# Patient Record
Sex: FEMALE | Race: WHITE | NOT HISPANIC OR LATINO | Employment: PART TIME | URBAN - METROPOLITAN AREA
[De-identification: names, ages, dates, MRNs, and addresses within clinical notes are randomized per-mention and may not be internally consistent; named-entity substitution may affect disease eponyms.]

---

## 2017-12-18 ENCOUNTER — OFFICE VISIT - RIVER FALLS (OUTPATIENT)
Dept: FAMILY MEDICINE | Facility: CLINIC | Age: 42
End: 2017-12-18

## 2017-12-18 ENCOUNTER — COMMUNICATION - RIVER FALLS (OUTPATIENT)
Dept: FAMILY MEDICINE | Facility: CLINIC | Age: 42
End: 2017-12-18

## 2017-12-18 ASSESSMENT — MIFFLIN-ST. JEOR: SCORE: 1649.2

## 2017-12-19 LAB
CHOLEST SERPL-MCNC: 246 MG/DL
CHOLEST/HDLC SERPL: 5.2 {RATIO}
HBA1C MFR BLD: 5.1 %
HDLC SERPL-MCNC: 47 MG/DL
LDLC SERPL CALC-MCNC: 165 MG/DL
NONHDLC SERPL-MCNC: 199 MG/DL
TRIGL SERPL-MCNC: 185 MG/DL

## 2017-12-23 ENCOUNTER — TRANSFERRED RECORDS (OUTPATIENT)
Dept: HEALTH INFORMATION MANAGEMENT | Facility: CLINIC | Age: 42
End: 2017-12-23

## 2017-12-23 LAB — HPV ABSTRACT: NORMAL

## 2018-01-04 ENCOUNTER — OFFICE VISIT - RIVER FALLS (OUTPATIENT)
Dept: FAMILY MEDICINE | Facility: CLINIC | Age: 43
End: 2018-01-04

## 2018-01-04 ASSESSMENT — MIFFLIN-ST. JEOR: SCORE: 1631.06

## 2018-02-23 ENCOUNTER — OFFICE VISIT - RIVER FALLS (OUTPATIENT)
Dept: FAMILY MEDICINE | Facility: CLINIC | Age: 43
End: 2018-02-23

## 2018-02-23 ASSESSMENT — MIFFLIN-ST. JEOR: SCORE: 1625.61

## 2018-03-22 ENCOUNTER — OFFICE VISIT - RIVER FALLS (OUTPATIENT)
Dept: FAMILY MEDICINE | Facility: CLINIC | Age: 43
End: 2018-03-22

## 2018-04-26 ENCOUNTER — OFFICE VISIT - RIVER FALLS (OUTPATIENT)
Dept: FAMILY MEDICINE | Facility: CLINIC | Age: 43
End: 2018-04-26

## 2018-05-15 ENCOUNTER — OFFICE VISIT - RIVER FALLS (OUTPATIENT)
Dept: FAMILY MEDICINE | Facility: CLINIC | Age: 43
End: 2018-05-15

## 2018-06-12 ENCOUNTER — OFFICE VISIT - RIVER FALLS (OUTPATIENT)
Dept: FAMILY MEDICINE | Facility: CLINIC | Age: 43
End: 2018-06-12

## 2018-07-06 ENCOUNTER — OFFICE VISIT - RIVER FALLS (OUTPATIENT)
Dept: FAMILY MEDICINE | Facility: CLINIC | Age: 43
End: 2018-07-06

## 2018-08-03 ENCOUNTER — OFFICE VISIT - RIVER FALLS (OUTPATIENT)
Dept: FAMILY MEDICINE | Facility: CLINIC | Age: 43
End: 2018-08-03

## 2018-08-03 ASSESSMENT — MIFFLIN-ST. JEOR: SCORE: 1635.59

## 2018-08-17 ENCOUNTER — OFFICE VISIT - RIVER FALLS (OUTPATIENT)
Dept: FAMILY MEDICINE | Facility: CLINIC | Age: 43
End: 2018-08-17

## 2018-08-17 ASSESSMENT — MIFFLIN-ST. JEOR: SCORE: 1635.59

## 2018-12-10 ENCOUNTER — OFFICE VISIT - RIVER FALLS (OUTPATIENT)
Dept: FAMILY MEDICINE | Facility: CLINIC | Age: 43
End: 2018-12-10

## 2018-12-14 LAB — CREAT SERPL-MCNC: 0.82 MG/DL

## 2019-01-02 ENCOUNTER — OFFICE VISIT - RIVER FALLS (OUTPATIENT)
Dept: FAMILY MEDICINE | Facility: CLINIC | Age: 44
End: 2019-01-02

## 2019-02-26 ENCOUNTER — OFFICE VISIT - RIVER FALLS (OUTPATIENT)
Dept: FAMILY MEDICINE | Facility: CLINIC | Age: 44
End: 2019-02-26

## 2019-04-01 ENCOUNTER — OFFICE VISIT - RIVER FALLS (OUTPATIENT)
Dept: FAMILY MEDICINE | Facility: CLINIC | Age: 44
End: 2019-04-01

## 2019-04-25 ENCOUNTER — OFFICE VISIT - RIVER FALLS (OUTPATIENT)
Dept: FAMILY MEDICINE | Facility: CLINIC | Age: 44
End: 2019-04-25

## 2019-04-25 ASSESSMENT — MIFFLIN-ST. JEOR: SCORE: 1670.07

## 2019-07-12 ENCOUNTER — OFFICE VISIT - RIVER FALLS (OUTPATIENT)
Dept: FAMILY MEDICINE | Facility: CLINIC | Age: 44
End: 2019-07-12

## 2019-07-12 ASSESSMENT — MIFFLIN-ST. JEOR: SCORE: 1671.88

## 2020-06-24 ENCOUNTER — OFFICE VISIT - RIVER FALLS (OUTPATIENT)
Dept: FAMILY MEDICINE | Facility: CLINIC | Age: 45
End: 2020-06-24

## 2020-10-07 ENCOUNTER — OFFICE VISIT - RIVER FALLS (OUTPATIENT)
Dept: FAMILY MEDICINE | Facility: CLINIC | Age: 45
End: 2020-10-07

## 2020-12-21 ENCOUNTER — VIRTUAL VISIT - RIVER FALLS (OUTPATIENT)
Dept: FAMILY MEDICINE | Facility: CLINIC | Age: 45
End: 2020-12-21

## 2020-12-22 ENCOUNTER — AMBULATORY - RIVER FALLS (OUTPATIENT)
Dept: FAMILY MEDICINE | Facility: CLINIC | Age: 45
End: 2020-12-22

## 2020-12-24 LAB — SARS-COV-2 RNA RESP QL NAA+PROBE: NEGATIVE

## 2021-09-23 ENCOUNTER — OFFICE VISIT - RIVER FALLS (OUTPATIENT)
Dept: FAMILY MEDICINE | Facility: CLINIC | Age: 46
End: 2021-09-23

## 2022-02-11 VITALS
HEART RATE: 73 BPM | DIASTOLIC BLOOD PRESSURE: 70 MMHG | OXYGEN SATURATION: 99 % | DIASTOLIC BLOOD PRESSURE: 76 MMHG | WEIGHT: 223.6 LBS | SYSTOLIC BLOOD PRESSURE: 110 MMHG | SYSTOLIC BLOOD PRESSURE: 108 MMHG | WEIGHT: 222.6 LBS | HEART RATE: 83 BPM | OXYGEN SATURATION: 96 % | TEMPERATURE: 96.9 F | BODY MASS INDEX: 38.21 KG/M2 | BODY MASS INDEX: 38.38 KG/M2 | TEMPERATURE: 96.9 F

## 2022-02-11 VITALS
DIASTOLIC BLOOD PRESSURE: 82 MMHG | HEART RATE: 80 BPM | DIASTOLIC BLOOD PRESSURE: 72 MMHG | SYSTOLIC BLOOD PRESSURE: 109 MMHG | TEMPERATURE: 98.1 F | TEMPERATURE: 97.6 F | HEIGHT: 64 IN | DIASTOLIC BLOOD PRESSURE: 75 MMHG | HEART RATE: 80 BPM | WEIGHT: 223.4 LBS | HEIGHT: 64 IN | SYSTOLIC BLOOD PRESSURE: 118 MMHG | WEIGHT: 225 LBS | WEIGHT: 225 LBS | TEMPERATURE: 97.7 F | SYSTOLIC BLOOD PRESSURE: 110 MMHG | OXYGEN SATURATION: 99 % | BODY MASS INDEX: 38.41 KG/M2 | DIASTOLIC BLOOD PRESSURE: 88 MMHG | HEART RATE: 84 BPM | HEART RATE: 72 BPM | TEMPERATURE: 97.3 F | SYSTOLIC BLOOD PRESSURE: 118 MMHG | BODY MASS INDEX: 38.41 KG/M2 | WEIGHT: 224 LBS

## 2022-02-12 VITALS
WEIGHT: 232.8 LBS | DIASTOLIC BLOOD PRESSURE: 82 MMHG | SYSTOLIC BLOOD PRESSURE: 124 MMHG | TEMPERATURE: 97.4 F | BODY MASS INDEX: 39.96 KG/M2 | OXYGEN SATURATION: 95 % | HEART RATE: 85 BPM

## 2022-02-12 VITALS
WEIGHT: 233 LBS | HEIGHT: 64 IN | TEMPERATURE: 97 F | HEART RATE: 72 BPM | RESPIRATION RATE: 16 BRPM | DIASTOLIC BLOOD PRESSURE: 88 MMHG | BODY MASS INDEX: 39.78 KG/M2 | SYSTOLIC BLOOD PRESSURE: 120 MMHG | OXYGEN SATURATION: 98 %

## 2022-02-12 VITALS
SYSTOLIC BLOOD PRESSURE: 112 MMHG | SYSTOLIC BLOOD PRESSURE: 116 MMHG | OXYGEN SATURATION: 97 % | HEART RATE: 71 BPM | TEMPERATURE: 98.3 F | DIASTOLIC BLOOD PRESSURE: 70 MMHG | DIASTOLIC BLOOD PRESSURE: 78 MMHG | TEMPERATURE: 98.3 F | HEIGHT: 64 IN | HEIGHT: 64 IN | SYSTOLIC BLOOD PRESSURE: 107 MMHG | DIASTOLIC BLOOD PRESSURE: 74 MMHG | BODY MASS INDEX: 38.04 KG/M2 | WEIGHT: 224 LBS | BODY MASS INDEX: 38.24 KG/M2 | HEIGHT: 64 IN | TEMPERATURE: 98.1 F | BODY MASS INDEX: 38.93 KG/M2 | WEIGHT: 222.8 LBS | HEART RATE: 84 BPM | WEIGHT: 228 LBS | HEART RATE: 74 BPM

## 2022-02-12 VITALS
DIASTOLIC BLOOD PRESSURE: 70 MMHG | HEART RATE: 82 BPM | WEIGHT: 229.2 LBS | DIASTOLIC BLOOD PRESSURE: 68 MMHG | SYSTOLIC BLOOD PRESSURE: 120 MMHG | BODY MASS INDEX: 39.71 KG/M2 | HEIGHT: 64 IN | BODY MASS INDEX: 39.34 KG/M2 | WEIGHT: 232.6 LBS | BODY MASS INDEX: 39.62 KG/M2 | DIASTOLIC BLOOD PRESSURE: 70 MMHG | TEMPERATURE: 97.5 F | SYSTOLIC BLOOD PRESSURE: 116 MMHG | WEIGHT: 230.8 LBS | HEART RATE: 82 BPM | HEART RATE: 78 BPM | OXYGEN SATURATION: 98 % | OXYGEN SATURATION: 97 % | TEMPERATURE: 97.7 F | SYSTOLIC BLOOD PRESSURE: 110 MMHG | TEMPERATURE: 97.1 F

## 2022-02-12 VITALS
BODY MASS INDEX: 38.42 KG/M2 | TEMPERATURE: 97.9 F | SYSTOLIC BLOOD PRESSURE: 122 MMHG | HEART RATE: 64 BPM | TEMPERATURE: 97.5 F | SYSTOLIC BLOOD PRESSURE: 102 MMHG | WEIGHT: 223.8 LBS | HEART RATE: 68 BPM | WEIGHT: 225.4 LBS | DIASTOLIC BLOOD PRESSURE: 62 MMHG | BODY MASS INDEX: 38.69 KG/M2 | DIASTOLIC BLOOD PRESSURE: 62 MMHG
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
## 2022-02-15 NOTE — NURSING NOTE
Comprehensive Intake Entered On:  4/1/2019 12:00 PM CDT    Performed On:  4/1/2019 11:55 AM CDT by Evy Granda LPN               Summary   Chief Complaint :   redness and pain at base of pulm from surgery.    Advance Directive :   No   Menstrual Status :   Hysterectomy   Weight Measured :   229.2 lb(Converted to: 229 lb 3 oz, 103.96 kg)    Systolic Blood Pressure :   120 mmHg   Diastolic Blood Pressure :   70 mmHg   Mean Arterial Pressure :   87 mmHg   Peripheral Pulse Rate :   82 bpm   BP Site :   Right arm   BP Method :   Manual   HR Method :   Electronic   Temperature Tympanic :   97.5 DegF(Converted to: 36.4 DegC)  (LOW)    Oxygen Saturation :   97 %   Evy Granda LPN 4/1/2019 11:55 AM CDT   Health Status   Allergies Verified? :   Yes   Medication History Verified? :   Yes   Medical History Verified? :   Yes   Pre-Visit Planning Status :   Completed   Tobacco Use? :   Never smoker   Evy Granda LPN 4/1/2019 11:55 AM CDT   Consents   Consent for Immunization Exchange :   Consent Granted   Consent for Immunizations to Providers :   Consent Granted   Evy Granda LPN 4/1/2019 11:55 AM CDT   Meds / Allergies   (As Of: 4/1/2019 12:00:39 PM CDT)   Allergies (Active)   No Known Medication Allergies  Estimated Onset Date:   Unspecified ; Created By:   Vicki Taylor CMA; Reaction Status:   Active ; Category:   Drug ; Substance:   No Known Medication Allergies ; Type:   Allergy ; Updated By:   Vicki Taylor CMA; Reviewed Date:   4/1/2019 11:58 AM CDT        Medication List   (As Of: 4/1/2019 12:00:39 PM CDT)   Prescription/Discharge Order    albuterol  :   albuterol ; Status:   Processing ; Ordered As Mnemonic:   Ventolin HFA 90 mcg/inh inhalation aerosol ; Ordering Provider:   Donny Garg PA-C; Action Display:   Complete ; Catalog Code:   albuterol ; Order Dt/Tm:   4/1/2019 11:58:29 AM          DULoxetine  :   DULoxetine ; Status:   Prescribed ; Ordered As Mnemonic:   DULoxetine 30 mg oral delayed  release capsule ; Simple Display Line:   30 mg, 1 cap(s), po, qam, in addition to the 60 mg qpm, 90 cap(s), 3 Refill(s) ; Ordering Provider:   Mari Abraham MD; Catalog Code:   DULoxetine ; Order Dt/Tm:   2/26/2019 11:50:14 AM          DULoxetine  :   DULoxetine ; Status:   Prescribed ; Ordered As Mnemonic:   DULoxetine 60 mg oral delayed release capsule ; Simple Display Line:   60 mg, 1 cap(s), po, qhs, 1 po qhs in addition to the 30 mg dose q am, 90 cap(s), 3 Refill(s) ; Ordering Provider:   Mari Abraham MD; Catalog Code:   DULoxetine ; Order Dt/Tm:   2/26/2019 11:50:32 AM          pantoprazole  :   pantoprazole ; Status:   Prescribed ; Ordered As Mnemonic:   Protonix 40 mg oral delayed release tablet ; Simple Display Line:   40 mg, 1 tab(s), PO, Daily, 90 tab(s), 3 Refill(s) ; Ordering Provider:   Mari Abraham MD; Catalog Code:   pantoprazole ; Order Dt/Tm:   12/10/2018 4:24:39 PM

## 2022-02-15 NOTE — NURSING NOTE
Comprehensive Intake Entered On:  6/24/2020 3:02 PM CDT    Performed On:  6/24/2020 2:59 PM CDT by Karen Crenshaw LPN               Summary   Chief Complaint :   Verbal consent given for video visit. medication refills   Advance Directive :   No   Menstrual Status :   Hysterectomy   Height/Length Estimated :   64 in(Converted to: 5 ft 4 in, 162.56 cm)    Karen Crenshaw LPN - 6/24/2020 2:59 PM CDT   Health Status   Allergies Verified? :   Yes   Medication History Verified? :   Yes   Pre-Visit Planning Status :   Completed   Karen Crenshaw LPN - 6/24/2020 2:59 PM CDT   Consents   Consent for Immunization Exchange :   Consent Granted   Consent for Immunizations to Providers :   Consent Granted   Karen Crenshaw LPN - 6/24/2020 2:59 PM CDT   Meds / Allergies   (As Of: 6/24/2020 3:02:20 PM CDT)   Allergies (Active)   No Known Medication Allergies  Estimated Onset Date:   Unspecified ; Created By:   Vicki Taylor CMA; Reaction Status:   Active ; Category:   Drug ; Substance:   No Known Medication Allergies ; Type:   Allergy ; Updated By:   Vicki Taylor CMA; Reviewed Date:   6/24/2020 3:00 PM CDT        Medication List   (As Of: 6/24/2020 3:02:20 PM CDT)   Prescription/Discharge Order    doxycycline  :   doxycycline ; Status:   Processing ; Ordered As Mnemonic:   doxycycline hyclate 100 mg oral tablet ; Ordering Provider:   Donny Garg PA-C; Action Display:   Complete ; Catalog Code:   doxycycline ; Order Dt/Tm:   6/24/2020 3:00:09 PM CDT          DULoxetine 30 mg oral delayed release capsule  :   DULoxetine 30 mg oral delayed release capsule ; Status:   Prescribed ; Ordered As Mnemonic:   DULoxetine 30 mg oral delayed release capsule ; Simple Display Line:   See Instructions, TAKE 1 CAPSULE BY MOUTH ONCE DAILY IN THE MORNING IN  ADDITION  TO  THE  60MG  ONCE  DAILY  IN  THE  EVENNG, 30 EA ; Ordering Provider:   Mari Abraham MD; Catalog Code:   DULoxetine ; Order Dt/Tm:   3/17/2020 4:13:18 PM  CDT          DULoxetine 60 mg oral delayed release capsule  :   DULoxetine 60 mg oral delayed release capsule ; Status:   Prescribed ; Ordered As Mnemonic:   DULoxetine 60 mg oral delayed release capsule ; Simple Display Line:   See Instructions, TAKE 1 CAPSULE BY MOUTH ONCE DAILY AT BEDTIME IN  ADDITION  TO  THE  30MG  DOSE  ONCE  DAILY  IN  THE  MORNING, 30 EA ; Ordering Provider:   Mari Abraham MD; Catalog Code:   DULoxetine ; Order Dt/Tm:   3/17/2020 4:13:18 PM CDT          pantoprazole 40 mg oral delayed release tablet  :   pantoprazole 40 mg oral delayed release tablet ; Status:   Prescribed ; Ordered As Mnemonic:   pantoprazole 40 mg oral delayed release tablet ; Simple Display Line:   1 tab(s), Oral, daily, 30 EA ; Ordering Provider:   Mari Abraham MD; Catalog Code:   pantoprazole ; Order Dt/Tm:   12/19/2019 5:34:56 PM CST            ID Risk Screen   Recent Travel History :   No recent travel   Family Member Travel History :   No recent travel   Other Exposure to Infectious Disease :   Unknown   Karen Crenshaw LPN - 6/24/2020 2:59 PM CDT

## 2022-02-15 NOTE — PROGRESS NOTES
Patient Information     Name:JOAQUINA IRBY      Address:      [address] [place] [id number]     Sex:Female     YOB: 1975     [phone]     Emergency Contact:CLEMENCIA IRBY     MRN:[id number]     FIN:[id number]     Location:[facility]     Date of Service:06/24/2020      Primary Care Physician:       Mari Abraham MD, [phone]      Attending Physician:       Mari Abraham MD, [phone]  Subjective      Today's visit was conducted via telemedicine, video,  due to the COVID-19 pandemic.       Patient consent, as follows, for telemedicine visit was obtained.   You have been scheduled for a telemedicine visit, which is a billable service.  This is a replacement for a face-to-face visit that is being recommended at this time to help keep our patient safe.  If, during our visit , we decide that you need a face-to-face visit, this visit will be canceled and you will be rescheduled to come into the clinic for a face to face appointment.  Can we proceed with a telemedicine visit?       HPI      She reports her mood symptoms are well controlled on the 90 mg of duloxetine daily, would like to cont, also tried to d/c her pantoprazole but gerd became sx so pt needs another refill.  she is working nights and plans to ga back to her      ROS 10 point ROS is neg except as per HPI      Review of systems is negative except as per HPI including:  no fevers, chills, sore throat, runny nose, nausea, vomiting, constipation, diarrhea, rash or new skin lesions, chest pain, palpitations, slurred speech, new paresthesia, shortness of breath or wheeze.      Exam:      General: alert and oriented ×3 no acute distress.      HEENT: pupils are equal round and reactive to light extraocular motion is intact. Normocephalic and atraumatic.       Hearing is grossly normal and there is no otorrhea.       Nares are patent there is no rhinorrhea.       Mucous membranes are moist  and pink.      Chest: has bilateral rise with no increased work of breathing.      Cardiovascular: normal perfusion and brisk capillary refill.      Musculoskeletal: no gross focal abnormalities and normal gait.      Neuro: no gross focal abnormalities and memory seems intact.      Psychiatric: speech is clear and coherent and fluent. Patient dressed appropriately for the weather. Mood is appropriate and affect is full.  judgement and insight are normal, no A/V hallucinations, no S/H ideation.  thought process linear                     Discussed with patient to return to clinic if symptoms worsen or do not improve      Discussed:      Contact clinic if sx worsen or do not improve.       Also discussed methods to reduce risks of Covid-19 including social isolation and avoid touching face and frequent, adequate hand washing.  If you develop upper respiratory symptoms then call the clinic or the ED to discuss whether or not you should come in for further evaluation and treatment.  Assessment/Plan       Anxiety (F41.1)         Ordered:          17780 office outpatient visit 15 minutes (Charge), Quantity: 1, Chronic GERD  Anxiety          Return to Clinic (Request), RFV: f/u mood, Return in 1 year                Chronic GERD (K21.9)         Ordered:          55283 office outpatient visit 15 minutes (Charge), Quantity: 1, Chronic GERD  Anxiety          Return to Clinic (Request), RFV: f/u mood, Return in 1 year                Orders:         DULoxetine, See Instructions, Instructions: TAKE 1 CAPSULE BY MOUTH ONCE DAILY AT BEDTIME IN ADDITION TO THE 30MG DOSE ONCE DAILY IN THE MORNING, # 30 EA, Type: Hard Stop, Pharmacy: [facility], (Completed)         DULoxetine, See Instructions, Instructions: TAKE 1 CAPSULE BY MOUTH ONCE DAILY IN THE MORNING IN ADDITION TO THE 60MG ONCE DAILY IN THE EVENNG, # 30 EA, Type: Hard Stop, Pharmacy: [facility], (Completed)         DULoxetine, See Instructions,  Instructions: TAKE 1 CAPSULE BY MOUTH ONCE DAILY AT BEDTIME IN ADDITION TO THE 30MG DOSE ONCE DAILY IN THE MORNING, # 90 EA, 0 Refill(s), Type: Soft Stop, Pharmacy: [facility], TAKE 1 CAPSULE BY MOUTH ONCE DAILY..., (Ordered)         DULoxetine, See Instructions, Instructions: TAKE 1 CAPSULE BY MOUTH ONCE DAILY IN THE MORNING IN ADDITION TO THE 60MG ONCE DAILY IN THE EVENNG, # 90 EA, 0 Refill(s), Type: Soft Stop, Pharmacy: [facility], TAKE 1 CAPSULE BY MOUTH ONCE DAILY IN..., (Ordered)         pantoprazole, = 1 tab(s), Oral, daily, # 90 tab(s), 3 Refill(s), Type: Soft Stop, Pharmacy: [facility], 1 tab(s) Oral daily, 64, 07/12/19 10:55:00 CDT, 233, lb, 07/12/19 10:55:00 CDT, (Ordered)         pantoprazole, 1 tab(s), Oral, daily, # 30 EA, Type: Hard Stop, Pharmacy: [facility], (Completed)      we will cont the duloxetine 60 po qhs and 30 po qam, also renewed the pantoprazole, will plan to f/u in 1 year but sooner if needed

## 2022-02-15 NOTE — TELEPHONE ENCOUNTER
---------------------  From: Claudia Tim   To: Mari Abraham MD;     Sent: 1/4/2019 1:19:06 PM CST  Subject: General Message     Could you please dictate for the 1-2-19 visit?  [facility] will not schedule until they receive referral and records.  Alicia lee!---------------------  From: Mari Abraham MD   To: Referral Coordinators Pool ([id number]);     Sent: 1/4/2019 3:23:02 PM CST  Subject: FW: General Message---------------------  From: Claudia Tim (Referral Coordinators Pool ([id number]))   To: Mari Abraham MD;     Sent: 1/4/2019 3:24:24 PM CST  Subject: RE: General Message     Thanks!

## 2022-02-15 NOTE — TELEPHONE ENCOUNTER
---------------------  From: Yvan WASHINGTON, Christa VILLAFUERTE   To: [facility] ([id number]_[place]);     Sent: 12/21/2020 4:10:31 PM CST  Subject: covid test needed      PLease call tonight and schedule for covid testing tomorrow (Tuesday).      Thank youpt scheduled

## 2022-02-15 NOTE — TELEPHONE ENCOUNTER
---------------------  From: Angle Morales LPN ([facility]Rx Pool ([id number]))   To: [facility] Message Pool ([id number]_[place] [facility] [place]);     Sent: 6/23/2021 8:15:06 AM CDT  Subject: FW: Medication Management   Due Date/Time: 6/22/2021 7:19:00 PM CDT     Please advise on request for duloxetine 30mg. No active Rx on med list.  30 days sent for pantoprazole. Pt due this month for follow up mood per RTC          ** Submitted: **  Order:pantoprazole (pantoprazole 40 mg oral delayed release tablet)  1 tab(s)  Oral  daily  Qty:  30 tab(s)        Refills:  0          Substitutions Allowed     Route To Pharmacy - [place] [id number]    Signed by Angle Morales LPN  6/23/2021 1:13:00 PM [facility]    ** Submitted: **  Complete:pantoprazole (pantoprazole 40 mg oral delayed release tablet)   Signed by Angle Morales LPN  6/23/2021 1:13:00 PM [facility]    ** Not Approved:  **  pantoprazole (Pantoprazole Sodium 40 MG Oral Tablet Delayed Release)  Take 1 tablet by mouth once daily  Qty:  90 tab(s)        Days Supply:  90        Refills:  0          Substitutions Allowed     Route To Pharmacy - [place] [address]   Signed by Angle Morales LPN            ------------------------------------------  From: [place] [address]  To: Mari Abraham MD  Sent: June 21, 2021 7:19:14 PM CDT  Subject: Medication Management  Due: June 4, 2021 8:26:15 PM CDT     ** On Hold Pending Signature **     Dispensed Drug: DULoxetine (DULoxetine 60 mg oral delayed release capsule), TAKE 1 CAPSULE BY MOUTH ONCE DAILY AT BEDTIME IN ADDITION TO THE 30MG CAP IN THE MORNING  Quantity: 90 EA  Days Supply: 90  Refills: 0  Substitutions Allowed  Notes from Pharmacy:     ** On Hold Pending Signature **     Dispensed Drug: pantoprazole (pantoprazole 40 mg oral delayed release tablet), Take 1 tablet by mouth once daily  Quantity: 90 tab(s)  Days Supply: 90  Refills: 0  Substitutions Allowed  Notes from Pharmacy:     ** On Hold Pending Signature **     Dispensed Drug: DULoxetine  (DULoxetine 30 mg oral delayed release capsule), TAKE 1 CAPSULE BY MOUTH ONCE DAILY IN THE MORNING IN ADDITION TO THE 60MG ONCE DAILY IN THE EVENING  Quantity: 90 EA  Days Supply: 90  Refills: 0  Substitutions Allowed  Notes from Pharmacy:  ---------------------------------------------------------------  From: Vicki Taylor CMA ([facility] Message Pool ([id number]))   To: Piotr Abraham MDica;     Sent: 6/23/2021 8:22:48 AM CDT  Subject: FW: Medication Management   Due Date/Time: 6/22/2021 7:19:00 PM CDT

## 2022-02-15 NOTE — PROGRESS NOTES
Patient:   JOAQUINA IRBY            MRN: [id number]            FIN: [id number]               Age:   42 years     Sex:  Female     :  1975   Associated Diagnoses:   None   Author:   Maikel Gatica MD      Visit Information      Date of Service: 2017 12:47 pm  Performing Location: [facility]  Encounter#: [id number]      Primary Care Provider (PCP):  Mingo HARECDebi    NPI# [id number]      Referring Provider:  Maikel Gatica MD    NPI# [id number]      Chief Complaint   2017 4:58 PM CST   Px. Would like to restart depression meds. Cymbalta did not work well.        History of Present Illness   CC as above and reviewed w  patient   depression has had long hx. denies signfiicant anxiety. no suicidality or pdw currently. pt w signifcant anhedonia, overeating, crying a lot  had been on fluoxetine in the past which helped some  cymbalta helped for a while but then hasn't been lately. stoped 2 months ago and depr sx have been worse since then  would like to try something new    overall physicla helath has been good.      Review of Systems   Constitutional:  Negative except as documented in history of present illness.    Eye:  Negative except as documented in history of present illness.    Ear/Nose/Mouth/Throat:  Negative except as documented in history of present illness.    Respiratory:  Negative except as documented in history of present illness.    Cardiovascular:  Negative except as documented in history of present illness.    Gastrointestinal:  Diarrhea, No nausea, No vomiting, No constipation.    Genitourinary:  Negative except as documented in history of present illness.    Gynecologic:  Negative except as documented in history of present illness.    Hematology/Lymphatics:  Negative except as documented in history of present illness.    Endocrine:  Negative except as documented in history of present illness.    Immunologic:  Negative except as documented in history of  present illness.    Musculoskeletal:  Negative except as documented in history of present illness.    Integumentary:  Negative except as documented in history of present illness.    Neurologic:  Negative except as documented in history of present illness.    Psychiatric:  Negative except as documented in history of present illness.             Physical Examination   Vital Signs   12/18/2017 4:58 PM CST Temperature Tympanic 98.3 DegF    Peripheral Pulse Rate 84 bpm    Systolic Blood Pressure 107 mmHg    Diastolic Blood Pressure 78 mmHg    Mean Arterial Pressure 88 mmHg      Measurements from flowsheet : Measurements   12/18/2017 4:58 PM CST Height Measured - Standard 64 in    Weight Measured - Standard 228 lb    BSA 2.16 m2    Body Mass Index 39.13 kg/m2      General:  Alert and oriented, No acute distress.    Eye:  Pupils are equal, round and reactive to light, Extraocular movements are intact, Normal conjunctiva.    HENT:  Normocephalic, Tympanic membranes are clear, Normal hearing.    Neck:  Supple, Non-tender.    Respiratory:  Lungs are clear to auscultation, Respirations are non-labored, Breath sounds are equal.    Cardiovascular:  Normal rate, Regular rhythm, No murmur.    Gastrointestinal:  Soft, Non-tender, Non-distended, Normal bowel sounds.    Genitourinary:  normal external genitalia. vaginal cuff looks and feels normal. bimanual exam unremarkbale.    Musculoskeletal:  Normal gait.    Integumentary:  Warm, Dry, No rash.    Neurologic:  Alert, Oriented, No focal deficits.    Psychiatric:  Cooperative, Appropriate mood & affect.       Impression and Plan   depression  try wellbutrin    preventative care  discussed pros and cons of mamography in 40s, would recommend given obesity. has been 4 yrs since last  recommended routine pap smears of vaginal cuff, due to hysterectomy for cervical dysplasia, through 2029. suggested to her the usual screening schedule. if neg today can go 5 years.   check fasting  screening labs

## 2022-02-15 NOTE — LETTER
(Inserted Image. Unable to display)                                                                                                [address] [facility] [address]                                                January 07, 2019Re: JOAQUINA CARUSODAKOTA1975 [facility]  [address], [place] [address]Eo:  [facility] The following patient has been referred to your office/practice:  JOAQUINA IRBYAppointment:  Appointment is PendingPlease refer to the attached  clinical documentation for a summary of JOAQUINA's care.  Please do not hesitate to contact our office if any additional clinical questions arise.  All relevant records and transition of care documents should be mailed or faxed.Your assistance in providing continuity of care is appreciated. Sincerely, [facility] [place] [place] [address](P) [phone](F) [phone]

## 2022-02-15 NOTE — NURSING NOTE
Comprehensive Intake Entered On:  1/2/2019 12:38 PM CST    Performed On:  1/2/2019 12:35 PM CST by Stephanie Falcon CMA               Summary   Chief Complaint :   f/u MRI and Xrays    Advance Directive :   No   Menstrual Status :   Hysterectomy   Weight Measured :   222.6 lb(Converted to: 222 lb 10 oz, 100.97 kg)    Systolic Blood Pressure :   110 mmHg   Diastolic Blood Pressure :   76 mmHg   Mean Arterial Pressure :   87 mmHg   Peripheral Pulse Rate :   73 bpm   BP Site :   Right arm   BP Method :   Manual   HR Method :   Electronic   Temperature Tympanic :   96.9 DegF(Converted to: 36.1 DegC)  (LOW)    Oxygen Saturation :   96 %   Stephanie Falcon CMA - 1/2/2019 12:35 PM CST   Health Status   Allergies Verified? :   Yes   Medication History Verified? :   Yes   Pre-Visit Planning Status :   Completed   Tobacco Use? :   Never smoker   Stephanie Falcon CMA - 1/2/2019 12:35 PM CST   Consents   Consent for Immunization Exchange :   Consent Granted   Consent for Immunizations to Providers :   Consent Granted   Stephanie Falcon CMA - 1/2/2019 12:35 PM CST   Meds / Allergies   (As Of: 1/2/2019 12:38:29 PM CST)   Allergies (Active)   No Known Medication Allergies  Estimated Onset Date:   Unspecified ; Created By:   Vicki Taylor CMA; Reaction Status:   Active ; Category:   Drug ; Substance:   No Known Medication Allergies ; Type:   Allergy ; Updated By:   Vicki Taylor CMA; Reviewed Date:   7/6/2018 3:40 PM CDT        Medication List   (As Of: 1/2/2019 12:38:29 PM CST)   Prescription/Discharge Order    albuterol  :   albuterol ; Status:   Prescribed ; Ordered As Mnemonic:   Ventolin HFA 90 mcg/inh inhalation aerosol ; Simple Display Line:   2 puff(s), INH, q4 hrs, 17 g, PRN: for cough ; Ordering Provider:   Donny Garg PA-C; Catalog Code:   albuterol ; Order Dt/Tm:   2/19/2014 4:14:38 PM          DULoxetine  :   DULoxetine ; Status:   Prescribed ; Ordered As Mnemonic:   DULoxetine 30 mg oral delayed release capsule ; Simple Display  Line:   30 mg, 1 cap(s), po, qam, in addition to the 60 mg qpm, 90 cap(s), 1 Refill(s) ; Ordering Provider:   Mari Abraham MD; Catalog Code:   DULoxetine ; Order Dt/Tm:   7/6/2018 4:00:16 PM          DULoxetine  :   DULoxetine ; Status:   Prescribed ; Ordered As Mnemonic:   DULoxetine 60 mg oral delayed release capsule ; Simple Display Line:   60 mg, 1 cap(s), po, qhs, 1 po qhs in addition to the 30 mg dose q am, 90 cap(s), 1 Refill(s) ; Ordering Provider:   Mari Abraham MD; Catalog Code:   DULoxetine ; Order Dt/Tm:   7/6/2018 3:59:49 PM          pantoprazole  :   pantoprazole ; Status:   Prescribed ; Ordered As Mnemonic:   Protonix 40 mg oral delayed release tablet ; Simple Display Line:   40 mg, 1 tab(s), PO, Daily, 90 tab(s), 3 Refill(s) ; Ordering Provider:   Mari Abraham MD; Catalog Code:   pantoprazole ; Order Dt/Tm:   12/10/2018 4:24:39 PM
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
## 2022-02-15 NOTE — PROCEDURES
Accession Number:       [id number]  STATUS:     FINAL  CONTAINER TYPE::     TP  QUESTION/PROBLEM:     VERIFIED SOURCE  FINAL RESOLUTION:     PROCESS [id number]

## 2022-02-15 NOTE — NURSING NOTE
Comprehensive Intake Entered On:  2/26/2019 11:19 AM CST    Performed On:  2/26/2019 11:13 AM CST by Stephanie Falcon CMA               Summary   Chief Complaint :   Pre-op DOS 3/4/19. Carpal Tunnel- Dr. Watkins- [facility]    Advance Directive :   No   Menstrual Status :   Hysterectomy   Weight Measured :   230.8 lb(Converted to: 230 lb 13 oz, 104.69 kg)    Systolic Blood Pressure :   110 mmHg   Diastolic Blood Pressure :   68 mmHg   Mean Arterial Pressure :   82 mmHg   Peripheral Pulse Rate :   82 bpm   BP Site :   Right arm   BP Method :   Manual   HR Method :   Electronic   Temperature Tympanic :   97.1 DegF(Converted to: 36.2 DegC)  (LOW)    Oxygen Saturation :   98 %   Stephanie Falcon CMA - 2/26/2019 11:13 AM CST   Health Status   Allergies Verified? :   Yes   Medication History Verified? :   Yes   Pre-Visit Planning Status :   Completed   Tobacco Use? :   Never smoker   Stephanie Falcon CMA - 2/26/2019 11:13 AM CST   Consents   Consent for Immunization Exchange :   Consent Granted   Consent for Immunizations to Providers :   Consent Granted   Stephanie Falcon CMA - 2/26/2019 11:13 AM CST   Meds / Allergies   (As Of: 2/26/2019 11:19:04 AM CST)   Allergies (Active)   No Known Medication Allergies  Estimated Onset Date:   Unspecified ; Created By:   Vicki Taylor CMA; Reaction Status:   Active ; Category:   Drug ; Substance:   No Known Medication Allergies ; Type:   Allergy ; Updated By:   Vicki Taylor CMA; Reviewed Date:   7/6/2018 3:40 PM CDT        Medication List   (As Of: 2/26/2019 11:19:04 AM CST)   Prescription/Discharge Order    albuterol  :   albuterol ; Status:   Prescribed ; Ordered As Mnemonic:   Ventolin HFA 90 mcg/inh inhalation aerosol ; Simple Display Line:   2 puff(s), INH, q4 hrs, 17 g, PRN: for cough ; Ordering Provider:   Donny Garg PA-C; Catalog Code:   albuterol ; Order Dt/Tm:   2/19/2014 4:14:38 PM          DULoxetine  :   DULoxetine ; Status:   Prescribed ; Ordered As  Mnemonic:   DULoxetine 30 mg oral delayed release capsule ; Simple Display Line:   30 mg, 1 cap(s), po, qam, in addition to the 60 mg qpm, 90 cap(s), 1 Refill(s) ; Ordering Provider:   Mari Abraham MD; Catalog Code:   DULoxetine ; Order Dt/Tm:   7/6/2018 4:00:16 PM          DULoxetine  :   DULoxetine ; Status:   Prescribed ; Ordered As Mnemonic:   DULoxetine 60 mg oral delayed release capsule ; Simple Display Line:   60 mg, 1 cap(s), po, qhs, 1 po qhs in addition to the 30 mg dose q am, 90 cap(s), 1 Refill(s) ; Ordering Provider:   Mari Abraham MD; Catalog Code:   DULoxetine ; Order Dt/Tm:   7/6/2018 3:59:49 PM          pantoprazole  :   pantoprazole ; Status:   Prescribed ; Ordered As Mnemonic:   Protonix 40 mg oral delayed release tablet ; Simple Display Line:   40 mg, 1 tab(s), PO, Daily, 90 tab(s), 3 Refill(s) ; Ordering Provider:   Mari Abraham MD; Catalog Code:   pantoprazole ; Order Dt/Tm:   12/10/2018 4:24:39 PM
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
## 2022-02-15 NOTE — CARE COORDINATION
Patient:   JOAQUINA IRBY            MRN: [id number]            FIN: [id number]               Age:   43 years     Sex:  Female     :  1975   Associated Diagnoses:   None   Author:   Addie Holt CMA      Sources of Information:  [ ] Patient, family member, or caregiver (Please list):  [x ] Hospital discharge summary  [ ] Hospital fax  [ ] List of recent hospitalizations or ED visits  [ ] Other:     Discharged From: [facility]  Discharge Date: 18    Diagnosis/Problem: Chest pain    Medication Changes: [ ] Yes [x ] No   Medication List Updated: [ ] Yes [x ] No  Clinic medication list reconciled with hospital medication list: Yes  Medications          *denotes recorded medication          DULoxetine 60 mg oral delayed release capsule: 60 mg, 1 cap(s), po, qhs, 1 po qhs in addition to the 30 mg dose q am, 90 cap(s), 1 Refill(s).          DULoxetine 30 mg oral delayed release capsule: 30 mg, 1 cap(s), po, qam, in addition to the 60 mg qpm, 90 cap(s), 1 Refill(s).          Ventolin HFA 90 mcg/inh inhalation aerosol: 2 puff(s), INH, q4 hrs, 17 g, PRN: for cough.      Needs Referral or Lab: [ ] Yes [x ] No    Needs Follow-up Appointment:  [x ] Within 7 days of discharge (highly complex visit)  [ ] Within 14 days of discharge (moderately complex visit)    Appointment Made With: ROBBI  Date: 12/10/18    LMTCB x 1appt completed
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
## 2022-02-15 NOTE — NURSING NOTE
Comprehensive Intake Entered On:  7/12/2019 11:01 AM CDT    Performed On:  7/12/2019 10:55 AM CDT by Ab Quinteros CMA               Summary   Chief Complaint :   Pt here for tick bite on Right hip.  Pt states she pulled a deer tick out 3 weeks ago.  Pt states she was seen in an Urgent care in [facility] and Lyme screen was negative.  Pt states shes still having bodyaches,diarrhea,vomiting and fever.   Advance Directive :   No   Menstrual Status :   Hysterectomy   Weight Measured :   233 lb(Converted to: 233 lb 0 oz, 105.69 kg)    Height Measured :   64 in(Converted to: 5 ft 4 in, 162.56 cm)    Body Mass Index :   39.99 kg/m2 (HI)    Body Surface Area :   2.18 m2   Systolic Blood Pressure :   120 mmHg   Diastolic Blood Pressure :   88 mmHg (HI)    Mean Arterial Pressure :   99 mmHg   Peripheral Pulse Rate :   72 bpm   BP Site :   Right arm   Pulse Site :   Radial artery   Temperature Tympanic :   97 DegF(Converted to: 36.1 DegC)  (LOW)    Respiratory Rate :   16 br/min   Oxygen Saturation :   98 %   Ab Quinteros CMA - 7/12/2019 10:55 AM CDT   Health Status   Allergies Verified? :   Yes   Medication History Verified? :   Yes   Medical History Verified? :   Yes   Pre-Visit Planning Status :   Not completed   Tobacco Use? :   Never smoker   Ab Quinteros CMA - 7/12/2019 10:55 AM CDT   Meds / Allergies   (As Of: 7/12/2019 11:01:08 AM CDT)   Allergies (Active)   No Known Medication Allergies  Estimated Onset Date:   Unspecified ; Created By:   Vicki Taylor CMA; Reaction Status:   Active ; Category:   Drug ; Substance:   No Known Medication Allergies ; Type:   Allergy ; Updated By:   Vicki Taylor CMA; Reviewed Date:   7/12/2019 10:59 AM CDT        Medication List   (As Of: 7/12/2019 11:01:08 AM CDT)   Prescription/Discharge Order    DULoxetine  :   DULoxetine ; Status:   Prescribed ; Ordered As Mnemonic:   DULoxetine 60 mg oral delayed release capsule ; Simple Display Line:   60 mg, 1 cap(s), po, qhs, 1 po qhs in  addition to the 30 mg dose q am, 90 cap(s), 3 Refill(s) ; Ordering Provider:   Mari Abraham MD; Catalog Code:   DULoxetine ; Order Dt/Tm:   2/26/2019 11:50:32 AM          DULoxetine  :   DULoxetine ; Status:   Prescribed ; Ordered As Mnemonic:   DULoxetine 30 mg oral delayed release capsule ; Simple Display Line:   30 mg, 1 cap(s), po, qam, in addition to the 60 mg qpm, 90 cap(s), 3 Refill(s) ; Ordering Provider:   Mari Abraham MD; Catalog Code:   DULoxetine ; Order Dt/Tm:   2/26/2019 11:50:14 AM          pantoprazole  :   pantoprazole ; Status:   Prescribed ; Ordered As Mnemonic:   Protonix 40 mg oral delayed release tablet ; Simple Display Line:   40 mg, 1 tab(s), PO, Daily, 90 tab(s), 3 Refill(s) ; Ordering Provider:   Mari Abraham MD; Catalog Code:   pantoprazole ; Order Dt/Tm:   12/10/2018 4:24:39 PM            Social History   Social History   (As Of: 7/12/2019 11:01:08 AM CDT)   Alcohol:        Past, Wine (5 oz), 1-2 times per week   (Last Updated: 12/20/2017 7:44:25 AM CST by Thu Foley)          Tobacco:        Never   (Last Updated: 7/24/2012 10:49:47 AM CDT by Aminah Almeida LPN)          Substance Abuse:        Never   (Last Updated: 7/24/2012 10:49:47 AM CDT by Aminah Almeida LPN)          Employment/School:        Employed, Work/School description: CNA.   (Last Updated: 12/20/2017 7:44:05 AM CST by Thu Foley)          Home/Environment:        Marital status: .  Spouse/Partner name: Quinton Bhagat children.  Risks in environment: owns secured gun.   (Last Updated: 12/20/2017 7:44:19 AM CST by Thu Foley)          Nutrition/Health:        Type of diet: Regular.   (Last Updated: 12/20/2017 7:44:31 AM CST by Thu Foley)          Exercise:        Exercise frequency: Never.  Exercise type: Walking.   (Last Updated: 12/20/2017 7:44:38 AM CST by Thu Foley)          Sexual:        Sexually active: Yes.  Sexual orientation: Heterosexual.  Other contraceptive use:  hysterectomy.   (Last Updated: 7/24/2012 10:49:47 AM CDT by Aminah Almeida LPN)

## 2022-02-15 NOTE — LETTER
(Inserted Image. Unable to display)   June 25, 2021    JOAQUINA IRBY  [address] [place] [id number]            Dear JOAQUINA,      Thank you for selecting [facility] for your healthcare needs.    Our records indicate you are due for the following services:     Follow-up office visit.    (FYI   Regarding office visits: In some instances, a video visit or telephone visit may be offered as an option.)      To schedule an appointment or if you have further questions, please contact your clinic at [phone].      Powered by [employer] and [employer]    Sincerely,    Mari Abraham MD
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
## 2022-02-15 NOTE — PROGRESS NOTES
Seen for COVID testing at [facility] per Christa Santoro PA-C    O2 Sat = 100%  (Children under 12 do not require O2 sat)    Specimen sent to:  [facility]    PUI form faxed to: [facility].

## 2022-02-15 NOTE — PROCEDURES
Accession Number:       [id number]  CLINICAL INFORMATION::     Hysterectomy, total Other high risk factor, specify  LMP::     NONE GIVEN  PREV. PAP::     NONE GIVEN  PREV. BX::     NONE GIVEN  SOURCE::     VAGINAL CUFF  STATEMENT OF ADEQUACY::     SATISFACTORY FOR EVALUATION  INTERPRETATION/RESULT::     Negative for intraepithelial lesion or malignancy.  COMMENT::     This Pap test has been evaluated with computer assisted technology.  CYTOTECHNOLOGIST::     DEWAYNE REAL(ASCP) CT Screening location: [address] [address]  HPV DNA (HIGH RISK):     NOT DETECTED       Tested for high risk types 16,18,31,33,35,39,45,51,52, 56,58,59,68.       The analytical performance characteristics of this       assay, when used to test SurePath or vaginal specimens,       have been determined by [employer].       Methodology: Hybrid Capture with Signal Amplification.
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
## 2022-02-15 NOTE — NURSING NOTE
Comprehensive Intake Entered On:  4/25/2019 1:09 PM CDT    Performed On:  4/25/2019 1:06 PM CDT by Leisa Camarena CMA               Summary   Chief Complaint :   wart removal on left foot   Advance Directive :   No   Menstrual Status :   Hysterectomy   Weight Measured :   232.6 lb(Converted to: 232 lb 10 oz, 105.51 kg)    Height Measured :   64 in(Converted to: 5 ft 4 in, 162.56 cm)    Body Mass Index :   39.92 kg/m2 (HI)    Body Surface Area :   2.18 m2   Systolic Blood Pressure :   116 mmHg   Diastolic Blood Pressure :   70 mmHg   Mean Arterial Pressure :   85 mmHg   Peripheral Pulse Rate :   78 bpm   BP Site :   Right arm   Pulse Site :   Radial artery   BP Method :   Manual   HR Method :   Manual   Temperature Tympanic :   97.7 DegF(Converted to: 36.5 DegC)  (LOW)    Leisa Camarena CMA - 4/25/2019 1:06 PM CDT   Health Status   Allergies Verified? :   Yes   Medication History Verified? :   Yes   Medical History Verified? :   No   Pre-Visit Planning Status :   Completed   Tobacco Use? :   Never smoker   Leisa Camarena CMA - 4/25/2019 1:06 PM CDT   Meds / Allergies   (As Of: 4/25/2019 1:09:59 PM CDT)   Allergies (Active)   No Known Medication Allergies  Estimated Onset Date:   Unspecified ; Created By:   Vicki Taylor CMA; Reaction Status:   Active ; Category:   Drug ; Substance:   No Known Medication Allergies ; Type:   Allergy ; Updated By:   Vicki Taylor CMA; Reviewed Date:   4/25/2019 1:08 PM CDT        Medication List   (As Of: 4/25/2019 1:09:59 PM CDT)   Prescription/Discharge Order    DULoxetine  :   DULoxetine ; Status:   Prescribed ; Ordered As Mnemonic:   DULoxetine 30 mg oral delayed release capsule ; Simple Display Line:   30 mg, 1 cap(s), po, qam, in addition to the 60 mg qpm, 90 cap(s), 3 Refill(s) ; Ordering Provider:   Mari Abraham MD; Catalog Code:   DULoxetine ; Order Dt/Tm:   2/26/2019 11:50:14 AM          DULoxetine  :   DULoxetine ; Status:   Prescribed ; Ordered As Mnemonic:    DULoxetine 60 mg oral delayed release capsule ; Simple Display Line:   60 mg, 1 cap(s), po, qhs, 1 po qhs in addition to the 30 mg dose q am, 90 cap(s), 3 Refill(s) ; Ordering Provider:   Mari Abraham MD; Catalog Code:   DULoxetine ; Order Dt/Tm:   2/26/2019 11:50:32 AM          pantoprazole  :   pantoprazole ; Status:   Prescribed ; Ordered As Mnemonic:   Protonix 40 mg oral delayed release tablet ; Simple Display Line:   40 mg, 1 tab(s), PO, Daily, 90 tab(s), 3 Refill(s) ; Ordering Provider:   Mari Abraham MD; Catalog Code:   pantoprazole ; Order Dt/Tm:   12/10/2018 4:24:39 PM
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
## 2022-02-15 NOTE — TELEPHONE ENCOUNTER
Entered by Vicki Taylor CMA on September 23, 2020 9:17:07 AM CDT  Date of last office visit and reason:  6/24/20 Mood w/ [facility]      Date of last Med Check / Px:   _  Date of last labs pertaining to med:  _     RTC order in chart:  Due back 6/2021 per [facility] RTC    For Protocol refill, has patient been contacted:  N/A        ------------------------------------------  From: [id number]  To: Mari Abraham MD  Sent: September 21, 2020 7:11:02 PM CDT  Subject: Medication Management  Due: September 9, 2020 4:20:39 PM CDT     ** On Hold Pending Signature **     Dispensed Drug: DULoxetine (DULoxetine 60 mg oral delayed release capsule), TAKE 1 CAPSULE BY MOUTH ONCE DAILY AT BEDTIME IN ADDITION TO THE 30MG DOSE ONCE DAILY IN THE MORNING  Quantity: 90 EA  Days Supply: 90  Refills: 0  Substitutions Allowed  Notes from Pharmacy:     ** On Hold Pending Signature **     Dispensed Drug: DULoxetine (DULoxetine 30 mg oral delayed release capsule), TAKE 1 CAPSULE BY MOUTH ONCE DAILY IN THE MORNING IN ADDITION TO THE 60MG ONCE DAILY IN THE EVENING  Quantity: 90 EA  Days Supply: 90  Refills: 0  Substitutions Allowed  Notes from Pharmacy:  ------------------------------------------  ** Submitted: **  Order:DULoxetine (DULoxetine 30 mg oral delayed release capsule)  See Instructions  TAKE 1 CAPSULE BY MOUTH ONCE DAILY IN  THE  MORNING  IN  ADDITION  TO  THE  60MG  ONCE  DAILY  IN  THE  EVENING  Qty:  90 EA        Days Supply:  90        Refills:  2          Substitutions Allowed     Route To Pharmacy - [id number]    Signed by Vicki Taylor CMA  9/23/2020 2:17:00 PM UT    ** Submitted: **  Complete:DULoxetine (DULoxetine 60 mg oral delayed release capsule)   Signed by Vicki Taylor CMA  9/23/2020 2:17:00 PM UT    ** Submitted: **  Complete:DULoxetine (DULoxetine 30 mg oral delayed release capsule)   Signed by Vicki Taylor CMA  9/23/2020 2:17:00 PM [facility]    ** Not Approved:  **  DULoxetine (DULoxetine HCl 30 MG  Oral Capsule Delayed Release Particles)  TAKE 1 CAPSULE BY MOUTH ONCE DAILY IN  THE  MORNING  IN  ADDITION  TO  THE  60MG  ONCE  DAILY  IN  THE  EVENING  Qty:  90 EA        Days Supply:  90        Refills:  0          Substitutions Allowed     Route To [place] [address]   Signed by Vicki Taylor CMA---------------------  From: Vicki Taylor CMA   To: [place] [id number]    Sent: 9/23/2020 9:17:53 AM CDT  Subject: Medication Management     ** Submitted: **  Order:DULoxetine (DULoxetine 60 mg oral delayed release capsule)  See Instructions  TAKE 1 CAPSULE BY MOUTH ONCE DAILY AT BEDTIME IN  ADDITION  TO  THE  30MG  DOSE  ONCE  DAILY  IN  THE  MORNING  Qty:  90 EA        Days Supply:  90        Refills:  2          Substitutions Allowed     Route To [place] [address]    Signed by Vicki Taylor CMA  9/23/2020 2:17:00 PM [facility]    ** Submitted: **  Complete:DULoxetine (DULoxetine 30 mg oral delayed release capsule)   Signed by Vicki Taylor CMA  9/23/2020 2:17:00 PM [facility]    ** Not Approved:  **  DULoxetine (DULoxetine HCl 60 MG Oral Capsule Delayed Release Particles)  TAKE 1 CAPSULE BY MOUTH ONCE DAILY AT BEDTIME IN  ADDITION  TO  THE  30MG  DOSE  ONCE  DAILY  IN  THE  MORNING  Qty:  90 EA        Days Supply:  90        Refills:  0          Substitutions Allowed     Route To [place] [address]   Signed by Vicki Taylor CMA

## 2022-02-15 NOTE — TELEPHONE ENCOUNTER
---------------------  From: Ryan Everett   To: Phone Messages Pool ([id number]_[facility]);     Cc: Yvan WASHINGTON, Christa VILLAFUERTE;      Sent: 12/24/2020 9:20:24 AM CST  Subject: Covid Results     Tried to call pt about her negative COVID results but no answer and unidentified phone. LVMTCBPt called back and was informed of negative COVID results. Pt is feeling better and had no questions or concerns at this time

## 2022-02-15 NOTE — NURSING NOTE
Comprehensive Intake Entered On:  12/21/2020 4:01 PM CST    Performed On:  12/21/2020 3:56 PM CST by Sugar Tamez MA               Summary   Chief Complaint :   verbal consent given for video visit.  c/o C19 exposure, now has sx since 12/19/2020--nausea, SOB, headaches, decreased appetite, cough, ST, chills, body aches.   Advance Directive :   No   Menstrual Status :   Hysterectomy   Height/Length Estimated :   64 in(Converted to: 5 ft 4 in, 162.56 cm)    Sugar Tamez MA - 12/21/2020 3:56 PM CST   Health Status   Allergies Verified? :   Yes   Medication History Verified? :   Yes   Medical History Verified? :   Yes   Pre-Visit Planning Status :   Completed   Tobacco Use? :   Never smoker   Sugar Tamez MA - 12/21/2020 3:56 PM CST   Consents   Consent for Immunization Exchange :   Consent Granted   Consent for Immunizations to Providers :   Consent Granted   Sugar Tamez MA - 12/21/2020 3:56 PM CST   Meds / Allergies   (As Of: 12/21/2020 4:01:08 PM CST)   Allergies (Active)   No Known Medication Allergies  Estimated Onset Date:   Unspecified ; Created By:   Vicki Taylor CMA; Reaction Status:   Active ; Category:   Drug ; Substance:   No Known Medication Allergies ; Type:   Allergy ; Updated By:   Vicki Taylor CMA; Reviewed Date:   6/24/2020 3:00 PM CDT        Medication List   (As Of: 12/21/2020 4:01:08 PM CST)   Prescription/Discharge Order    DULoxetine  :   DULoxetine ; Status:   Prescribed ; Ordered As Mnemonic:   DULoxetine 60 mg oral delayed release capsule ; Simple Display Line:   See Instructions, TAKE 1 CAPSULE BY MOUTH ONCE DAILY AT BEDTIME IN  ADDITION  TO  THE  30MG  DOSE  ONCE  DAILY  IN  THE  MORNING, 90 EA, 2 Refill(s) ; Ordering Provider:   Mari Abraham MD; Catalog Code:   DULoxetine ; Order Dt/Tm:   9/23/2020 9:17:38 AM CDT          pantoprazole  :   pantoprazole ; Status:   Prescribed ; Ordered As Mnemonic:   pantoprazole 40 mg oral delayed release tablet ; Simple Display Line:    1 tab(s), Oral, daily, 90 tab(s), 3 Refill(s) ; Ordering Provider:   Mari Abraham MD; Catalog Code:   pantoprazole ; Order Dt/Tm:   6/24/2020 3:15:25 PM CDT            ID Risk Screen   Recent Travel History :   No recent travel   Family Member Travel History :   No recent travel   Other Exposure to Infectious Disease :   Healthcare exposure to COVID-19 within the last 14 days   Sugar Tamez MA - 12/21/2020 3:56 PM CST   Social History   Social History   (As Of: 12/21/2020 4:01:08 PM CST)   Alcohol:        Past, Wine (5 oz), 1-2 times per week   (Last Updated: 12/20/2017 7:44:25 AM CST by Thu Foley)          Tobacco:        Never (less than 100 in lifetime)   (Last Updated: 12/21/2020 3:56:31 PM CST by Sugar Tamez MA)          Electronic Cigarette/Vaping:        Electronic Cigarette Use: Never.   (Last Updated: 12/21/2020 3:56:37 PM CST by Sugar Tamez MA)          Substance Abuse:        Never   (Last Updated: 7/24/2012 10:49:47 AM CDT by Aminah Almeida LPN)          Employment/School:        Employed, Work/School description: CNA.   (Last Updated: 12/20/2017 7:44:05 AM CST by Thu Foley)          Home/Environment:        Marital status: .  Spouse/Partner name: Quinton Bhagat children.  Risks in environment: owns secured gun.   (Last Updated: 12/20/2017 7:44:19 AM CST by Thu Foley)          Nutrition/Health:        Type of diet: Regular.   (Last Updated: 12/20/2017 7:44:31 AM CST by Thu Foley)          Exercise:        Exercise frequency: Never.  Exercise type: Walking.   (Last Updated: 12/20/2017 7:44:38 AM CST by Thu Foley)          Sexual:        Sexually active: Yes.  Sexual orientation: Heterosexual.  Other contraceptive use: hysterectomy.   (Last Updated: 7/24/2012 10:49:47 AM CDT by Aminah Almeida LPN)
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
## 2022-02-15 NOTE — TELEPHONE ENCOUNTER
---------------------  From: Gena Agosto (eRx Pool ([facility]224_[facility]))   To: [facility] Message Pool ([address]_[place]);     Sent: 12/19/2019 11:19:03 AM CST  Subject: FW: Medication Management   Due Date/Time: 12/19/2019 7:26:00 PM CST     Medication Refill needing approval    PCP:   [facility]    Medication:   pantoprazole   Last Filled:  12/10/2018                  Quantity:  90                  Refills:  3  CSA on file?   n/a     Date of last office visit and reason:   07/12/2019; Tick bite  Date of last labs pertaining to condition:  n/a     Note:  prescription is over 1 yr old. No RTC order placed. Please advise on refill. Ok to fill?    Return to Clinic order placed?  No     Resource:   eRx          ------------------------------------------  From: [id number]  To: Mari Abraham MD  Sent: December 18, 2019 7:26:37 PM CST  Subject: Medication Management  Due: December 19, 2019 7:26:37 PM CST    ** On Hold Pending Signature **  Drug: pantoprazole (pantoprazole 40 mg oral delayed release tablet)  TAKE 1 TABLET BY MOUTH ONCE DAILY  Quantity: 90 EA  Days Supply: 90  Refills: 0  Substitutions Allowed  Notes from Pharmacy:     Dispensed Drug: pantoprazole (pantoprazole 40 mg oral delayed release tablet)  TAKE 1 TABLET BY MOUTH ONCE DAILY  Quantity: 30 EA  Days Supply: 30  Refills: 0  Substitutions Allowed  Notes from Pharmacy:   ---------------------------------------------------------------  From: Stephanie Falcon CMA ([facility] Message Pool ([facility]))   To: Mari Abraham MD;     Sent: 12/19/2019 11:23:35 AM CST  Subject: FW: Medication Management   Due Date/Time: 12/19/2019 7:26:00 PM CST---------------------  From: Mari Abraham MD   To: [facility] 5432    Sent: 12/19/2019 5:34:58 PM CST  Subject: FW: Medication Management     ** Submitted: **  Complete:pantoprazole (Protonix 40 mg oral delayed release tablet)   Signed by Mari Abraham MD  12/19/2019 5:34:00  PM    ** Approved **  pantoprazole (Pantoprazole Sodium 40 MG Oral Tablet Delayed Release)  TAKE 1 TABLET BY MOUTH ONCE DAILY  Qty:  30 EA        Days Supply:  30        Refills:  0          Substitutions Allowed     Route To Pharmacy - [facility] [id number]

## 2022-02-15 NOTE — TELEPHONE ENCOUNTER
---------------------  From: Vicki Taylor CMA (eRx Pool (32224_[facility]))   To: [facility] Message Pool ([address]_[place]);     Sent: 3/16/2020 1:05:31 PM CDT  Subject: FW: Medication Management   Due Date/Time: 3/17/2020 11:51:00 AM CDT     Last filled 02/26/2019 and last OV w/ [facility] associated to med 02/26/2019. Please advise on refill.        ------------------------------------------  From: [facility]  To: Mari Abraham MD  Sent: March 16, 2020 11:51:06 AM CDT  Subject: Medication Management  Due: March 17, 2020 11:51:06 AM CDT    ** On Hold Pending Signature **  Drug: DULoxetine (DULoxetine 60 mg oral delayed release capsule)  TAKE 1 CAPSULE BY MOUTH ONCE DAILY AT BEDTIME IN  ADDITION  TO  THE  30MG  DOSE  ONCE  DAILY  IN  THE  MORNING  Quantity: 90 EA  Days Supply: 90  Refills: 0  Substitutions Allowed  Notes from Pharmacy:     Dispensed Drug: DULoxetine (DULoxetine 60 mg oral delayed release capsule)  TAKE 1 CAPSULE BY MOUTH ONCE DAILY AT BEDTIME IN  ADDITION  TO  THE  30MG  DOSE  ONCE  DAILY  IN  THE  MORNING  Quantity: 30 EA  Days Supply: 30  Refills: 0  Substitutions Allowed  Notes from Pharmacy:     ** On Hold Pending Signature **  Drug: DULoxetine (DULoxetine 30 mg oral delayed release capsule)  TAKE 1 CAPSULE BY MOUTH ONCE DAILY IN THE MORNING IN  ADDITION  TO  THE  60MG  ONCE  DAILY  IN  THE  EVENNG  Quantity: 90 EA  Days Supply: 90  Refills: 0  Substitutions Allowed  Notes from Pharmacy:     Dispensed Drug: DULoxetine (DULoxetine 30 mg oral delayed release capsule)  TAKE 1 CAPSULE BY MOUTH ONCE DAILY IN THE MORNING IN  ADDITION  TO  THE  60MG  ONCE  DAILY  IN  THE  EVENNG  Quantity: 30 EA  Days Supply: 30  Refills: 0  Substitutions Allowed  Notes from Pharmacy:   ---------------------------------------------------------------  From: Gena Agosto ([facility] Message Pool ([address]_[facility]))   To: Mari Abraham MD;     Sent: 3/16/2020 3:26:46 PM CDT  Subject: FW:  Medication Management   Due Date/Time: 3/17/2020 11:51:00 AM CDT---------------------  From: Mari Abraham MD   To: [facility]    Sent: 3/17/2020 4:13:20 PM CDT  Subject: FW: Medication Management     ** Submitted: **  Complete:DULoxetine (DULoxetine 30 mg oral delayed release capsule)   Signed by Mari Abraham MD  3/17/2020 9:13:00 PM    ** Submitted: **  Complete:DULoxetine (DULoxetine 60 mg oral delayed release capsule)   Signed by Mari Abraham MD  3/17/2020 9:13:00 PM    ** Approved **  DULoxetine (DULoxetine HCl 30 MG Oral Capsule Delayed Release Particles)  TAKE 1 CAPSULE BY MOUTH ONCE DAILY IN THE MORNING IN  ADDITION  TO  THE  60MG  ONCE  DAILY  IN  THE  EVENNG  Qty:  30 EA        Days Supply:  30        Refills:  0          Substitutions Allowed     Route To Pharmacy - [facility]       ** Approved **  DULoxetine (DULoxetine HCl 60 MG Oral Capsule Delayed Release Particles)  TAKE 1 CAPSULE BY MOUTH ONCE DAILY AT BEDTIME IN  ADDITION  TO  THE  30MG  DOSE  ONCE  DAILY  IN  THE  MORNING  Qty:  30 EA        Days Supply:  30        Refills:  0          Substitutions Allowed     Route To Pharmacy - [facility]
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
## 2022-02-15 NOTE — NURSING NOTE
Comprehensive Intake Entered On:  9/23/2021 3:46 PM CDT    Performed On:  9/23/2021 3:42 PM CDT by Kathryn Salvador               Summary   Chief Complaint :   video visit consent, Chronic pain med check refill request duloxetine   Kathryn Salvador - 9/23/2021 3:47 PM CDT     Advance Directive :   No   Menstrual Status :   Hysterectomy   Height/Length Estimated :   64 in(Converted to: 5 ft 4 in, 162.56 cm)    Kathryn Salvador - 9/23/2021 3:42 PM CDT   Health Status   Allergies Verified? :   Yes   Medication History Verified? :   Yes   Tobacco Use? :   Never smoker   Kathryn Salvador - 9/23/2021 3:42 PM CDT   Consents   Consent for Immunization Exchange :   Consent Granted   Consent for Immunizations to Providers :   Consent Granted   Kathryn Salvador - 9/23/2021 3:42 PM CDT   Meds / Allergies   (As Of: 9/23/2021 3:46:42 PM CDT)   Allergies (Active)   No Known Medication Allergies  Estimated Onset Date:   Unspecified ; Created By:   Vicki Taylor CMA; Reaction Status:   Active ; Category:   Drug ; Substance:   No Known Medication Allergies ; Type:   Allergy ; Updated By:   Vicki Taylor CMA; Reviewed Date:   9/23/2021 3:43 PM CDT        Medication List   (As Of: 9/23/2021 3:46:42 PM CDT)   Prescription/Discharge Order    DULoxetine  :   DULoxetine ; Status:   Prescribed ; Ordered As Mnemonic:   DULoxetine 30 mg oral delayed release capsule ; Simple Display Line:   See Instructions, TAKE 1 CAPSULE BY MOUTH ONCE DAILY IN THE MORNING IN  ADDITION  TO  THE  60MG  ONCE  DAILY  IN  THE  EVENING, 90 EA, 0 Refill(s) ; Ordering Provider:   Mari Abraham MD; Catalog Code:   DULoxetine ; Order Dt/Tm:   6/23/2021 3:04:40 PM CDT          DULoxetine  :   DULoxetine ; Status:   Prescribed ; Ordered As Mnemonic:   DULoxetine 60 mg oral delayed release capsule ; Simple Display Line:   See Instructions, TAKE 1 CAPSULE BY MOUTH ONCE DAILY AT BEDTIME IN  ADDITION  TO  THE  30MG  CAP  IN  THE  MORNING, 90 EA, 0 Refill(s) ; Ordering  Provider:   Mari Abraham MD; Catalog Code:   DULoxetine ; Order Dt/Tm:   6/23/2021 3:04:40 PM CDT          DULoxetine  :   DULoxetine ; Status:   Prescribed ; Ordered As Mnemonic:   DULoxetine 60 mg oral delayed release capsule ; Simple Display Line:   See Instructions, TAKE 1 CAPSULE BY MOUTH ONCE DAILY AT BEDTIME IN  ADDITION  TO  THE  30MG  DOSE  ONCE  DAILY  IN  THE  MORNING, 90 EA, 2 Refill(s) ; Ordering Provider:   Mari Abraham MD; Catalog Code:   DULoxetine ; Order Dt/Tm:   9/23/2020 9:17:38 AM CDT          pantoprazole  :   pantoprazole ; Status:   Processing ; Ordered As Mnemonic:   pantoprazole 40 mg oral delayed release tablet ; Ordering Provider:   Mari Abraham MD; Action Display:   Complete ; Catalog Code:   pantoprazole ; Order Dt/Tm:   9/23/2021 3:43:47 PM CDT            Social History   Social History   (As Of: 9/23/2021 3:46:42 PM CDT)   Alcohol:        Past, Wine (5 oz), 1-2 times per week   (Last Updated: 12/20/2017 7:44:25 AM CST by Thu Foley)          Tobacco:        Never (less than 100 in lifetime)   (Last Updated: 12/21/2020 3:56:31 PM CST by Sugar Tamez MA)          Electronic Cigarette/Vaping:        Electronic Cigarette Use: Never.   (Last Updated: 12/21/2020 3:56:37 PM CST by Sugar Tamez MA)          Substance Abuse:        Never   (Last Updated: 7/24/2012 10:49:47 AM CDT by Aminah Almeida LPN)          Employment/School:        Employed, Work/School description: CNA.   (Last Updated: 12/20/2017 7:44:05 AM CST by Thu Foley)          Home/Environment:        Marital status: .  Spouse/Partner name: Quinton Bhagat children.  Risks in environment: owns secured gun.   (Last Updated: 12/20/2017 7:44:19 AM CST by Thu Foley)          Nutrition/Health:        Type of diet: Regular.   (Last Updated: 12/20/2017 7:44:31 AM CST by Thu Foley)          Exercise:        Exercise frequency: Never.  Exercise type: Walking.   (Last Updated: 12/20/2017 7:44:38 AM CST  by Thu Foley)          Sexual:        Sexually active: Yes.  Sexual orientation: Heterosexual.  Other contraceptive use: hysterectomy.   (Last Updated: 7/24/2012 10:49:47 AM CDT by Aminah Almeida LPN)
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
## 2022-02-15 NOTE — NURSING NOTE
Comprehensive Intake Entered On:  10/7/2020 10:13 AM CDT    Performed On:  10/7/2020 10:10 AM CDT by Stephanie Lozano CMA               Summary   Chief Complaint :   c/o lump on right heel, pain since Sunday- no known injury. c/o left knee pain x1 month, no known injury.    Advance Directive :   No   Menstrual Status :   Hysterectomy   Weight Measured :   232.8 lb(Converted to: 232 lb 13 oz, 105.596 kg)    Height/Length Estimated :   64 in(Converted to: 5 ft 4 in, 162.56 cm)    Systolic Blood Pressure :   124 mmHg   Diastolic Blood Pressure :   82 mmHg (HI)    Mean Arterial Pressure :   96 mmHg   Peripheral Pulse Rate :   85 bpm   BP Site :   Right arm   BP Method :   Manual   HR Method :   Electronic   Temperature Tympanic :   97.4 DegF(Converted to: 36.3 DegC)  (LOW)    Oxygen Saturation :   95 %   Stephanie Lozano CMA - 10/7/2020 10:10 AM CDT   Health Status   Allergies Verified? :   Yes   Medication History Verified? :   Yes   Pre-Visit Planning Status :   N/A   Tobacco Use? :   Never smoker   Stephanie Lozano CMA - 10/7/2020 10:10 AM CDT   Consents   Consent for Immunization Exchange :   Consent Granted   Consent for Immunizations to Providers :   Consent Granted   Stephanie Lozano CMA - 10/7/2020 10:10 AM CDT   Meds / Allergies   (As Of: 10/7/2020 10:13:44 AM CDT)   Allergies (Active)   No Known Medication Allergies  Estimated Onset Date:   Unspecified ; Created By:   Vicki Taylor CMA; Reaction Status:   Active ; Category:   Drug ; Substance:   No Known Medication Allergies ; Type:   Allergy ; Updated By:   Vicki Taylor CMA; Reviewed Date:   6/24/2020 3:00 PM CDT        Medication List   (As Of: 10/7/2020 10:13:44 AM CDT)   Prescription/Discharge Order    DULoxetine  :   DULoxetine ; Status:   Prescribed ; Ordered As Mnemonic:   DULoxetine 60 mg oral delayed release capsule ; Simple Display Line:   See Instructions, TAKE 1 CAPSULE BY MOUTH ONCE DAILY AT BEDTIME IN  ADDITION  TO  THE  30MG  DOSE  ONCE  DAILY  IN   THE  MORNING, 90 EA, 2 Refill(s) ; Ordering Provider:   Mari Abraham MD; Catalog Code:   DULoxetine ; Order Dt/Tm:   9/23/2020 9:17:38 AM CDT          pantoprazole  :   pantoprazole ; Status:   Prescribed ; Ordered As Mnemonic:   pantoprazole 40 mg oral delayed release tablet ; Simple Display Line:   1 tab(s), Oral, daily, 90 tab(s), 3 Refill(s) ; Ordering Provider:   Mari Abraham MD; Catalog Code:   pantoprazole ; Order Dt/Tm:   6/24/2020 3:15:25 PM CDT            ID Risk Screen   Recent Travel History :   No recent travel   Family Member Travel History :   No recent travel   Other Exposure to Infectious Disease :   Unknown   Stephanie Lozano CMA - 10/7/2020 10:10 AM CDT
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
## 2022-04-03 ENCOUNTER — HEALTH MAINTENANCE LETTER (OUTPATIENT)
Age: 47
End: 2022-04-03

## 2022-07-11 DIAGNOSIS — F33.1 MAJOR DEPRESSIVE DISORDER, RECURRENT EPISODE, MODERATE (H): Primary | ICD-10-CM

## 2022-07-13 NOTE — TELEPHONE ENCOUNTER
Last Written Prescription Date:  9/23/21  Last Fill Quantity: 180,  # refills: 1   Last office visit: 9/23/21, chronic pain/med check  Future Office Visit:  Was due for 6 month follow up in March 2022.          Routing refill request to provider for review/approval because:  Drug not active on patient's medication list  Patient needs to be seen because:  She is past due for 6 month follow up     Approval needed. Patient needs appointment.    Yanet Olvera RN

## 2022-07-14 RX ORDER — DULOXETIN HYDROCHLORIDE 60 MG/1
CAPSULE, DELAYED RELEASE ORAL
Qty: 60 CAPSULE | Refills: 0 | Status: SHIPPED | OUTPATIENT
Start: 2022-07-14 | End: 2022-09-12

## 2022-08-31 NOTE — TELEPHONE ENCOUNTER
I called and spoke with patient, she is now switching to Nativis as her insurance covers them better. I verbalized understanding, NO FURTHER REFILLS.

## 2022-09-09 ASSESSMENT — PATIENT HEALTH QUESTIONNAIRE - PHQ9
SUM OF ALL RESPONSES TO PHQ QUESTIONS 1-9: 0
SUM OF ALL RESPONSES TO PHQ QUESTIONS 1-9: 0
10. IF YOU CHECKED OFF ANY PROBLEMS, HOW DIFFICULT HAVE THESE PROBLEMS MADE IT FOR YOU TO DO YOUR WORK, TAKE CARE OF THINGS AT HOME, OR GET ALONG WITH OTHER PEOPLE: NOT DIFFICULT AT ALL

## 2022-09-12 ENCOUNTER — VIRTUAL VISIT (OUTPATIENT)
Dept: FAMILY MEDICINE | Facility: CLINIC | Age: 47
End: 2022-09-12
Payer: COMMERCIAL

## 2022-09-12 DIAGNOSIS — F33.1 MAJOR DEPRESSIVE DISORDER, RECURRENT EPISODE, MODERATE (H): ICD-10-CM

## 2022-09-12 PROCEDURE — 99213 OFFICE O/P EST LOW 20 MIN: CPT | Mod: 95 | Performed by: FAMILY MEDICINE

## 2022-09-12 PROCEDURE — 96127 BRIEF EMOTIONAL/BEHAV ASSMT: CPT | Performed by: FAMILY MEDICINE

## 2022-09-12 RX ORDER — DULOXETIN HYDROCHLORIDE 60 MG/1
60 CAPSULE, DELAYED RELEASE ORAL 2 TIMES DAILY
Qty: 180 CAPSULE | Refills: 1 | Status: SHIPPED | OUTPATIENT
Start: 2022-09-12 | End: 2023-08-02

## 2022-09-12 ASSESSMENT — PATIENT HEALTH QUESTIONNAIRE - PHQ9
10. IF YOU CHECKED OFF ANY PROBLEMS, HOW DIFFICULT HAVE THESE PROBLEMS MADE IT FOR YOU TO DO YOUR WORK, TAKE CARE OF THINGS AT HOME, OR GET ALONG WITH OTHER PEOPLE: NOT DIFFICULT AT ALL
SUM OF ALL RESPONSES TO PHQ QUESTIONS 1-9: 0

## 2022-09-12 NOTE — LETTER
My Depression Action Plan  Name: Aminah Chappell   Date of Birth 1975  Date: 9/12/2022    My doctor: No Ref-Primary, Physician   My clinic: [address] [address] [phone]          GREEN    ZONE   Good Control    What it looks like:     Things are going generally well. You have normal ups and downs. You may even feel depressed from time to time, but bad moods usually last less than a day.   What you need to do:  1. Continue to care for yourself (see self care plan)  2. Check your depression survival kit and update it as needed  3. Follow your physician s recommendations including any medication.  4. Do not stop taking medication unless you consult with your physician first.           YELLOW         ZONE Getting Worse    What it looks like:     Depression is starting to interfere with your life.     It may be hard to get out of bed; you may be starting to isolate yourself from others.    Symptoms of depression are starting to last most all day and this has happened for several days.     You may have suicidal thoughts but they are not constant.   What you need to do:     1. Call your care team. Your response to treatment will improve if you keep your care team informed of your progress. Yellow periods are signs an adjustment may need to be made.     2. Continue your self-care.  Just get dressed and ready for the day.  Don't give yourself time to talk yourself out of it.    3. Talk to someone in your support network.    4. Open up your Depression Self-Care Plan/Wellness Kit.           RED    ZONE Medical Alert - Get Help    What it looks like:     Depression is seriously interfering with your life.     You may experience these or other symptoms: You can t get out of bed most days, can t work or engage in other necessary activities, you have trouble taking care of basic hygiene, or basic responsibilities, thoughts of suicide or  death that will not go away, self-injurious behavior.     What you need to do:  1. Call your care team and request a same-day appointment. If they are not available (weekends or after hours) call your local crisis line, emergency room or 911.          Depression Self-Care Plan / Wellness Kit    Many people find that medication and therapy are helpful treatments for managing depression. In addition, making small changes to your everyday life can help to boost your mood and improve your wellbeing. Below are some tips for you to consider. Be sure to talk with your medical provider and/or behavioral health consultant if your symptoms are worsening or not improving.     Sleep   Sleep hygiene  means all of the habits that support good, restful sleep. It includes maintaining a consistent bedtime and wake time, using your bedroom only for sleeping or sex, and keeping the bedroom dark and free of distractions like a computer, smartphone, or television.     Develop a Healthy Routine  Maintain good hygiene. Get out of bed in the morning, make your bed, brush your teeth, take a shower, and get dressed. Don t spend too much time viewing media that makes you feel stressed. Find time to relax each day.    Exercise  Get some form of exercise every day. This will help reduce pain and release endorphins, the  feel good  chemicals in your brain. It can be as simple as just going for a walk or doing some gardening, anything that will get you moving.      Diet  Strive to eat healthy foods, including fruits and vegetables. Drink plenty of water. Avoid excessive sugar, caffeine, alcohol, and other mood-altering substances.     Stay Connected with Others  Stay in touch with friends and family members.    Manage Your Mood  Try deep breathing, massage therapy, biofeedback, or meditation. Take part in fun activities when you can. Try to find something to smile about each day.     Psychotherapy  Be open to working with a therapist if your  provider recommends it.     Medication  Be sure to take your medication as prescribed. Most anti-depressants need to be taken every day. It usually takes several weeks for medications to work. Not all medicines work for all people. It is important to follow-up with your provider to make sure you have a treatment plan that is working for you. Do not stop your medication abruptly without first discussing it with your provider.    Crisis Resources   These hotlines are for both adults and children. They and are open 24 hours a day, 7 days a week unless noted otherwise.      National Suicide Prevention Lifeline   988 or 6-597-400-KTJO (8720)      Crisis Text Line    www.crisistextline.org  Text HOME to 888118 from anywhere in the [place], anytime, about any type of crisis. A live, trained crisis counselor will receive the text and respond quickly.      [facility] Lifeline for LGBTQ Youth  A national crisis intervention and suicide lifeline for LGBTQ youth under 25. Provides a safe place to talk without judgement. Call 1-389.520.9219; text START to 528570 or visit www.thetrevorproject.org to talk to a trained counselor.      For [facility] crisis numbers, visit the [facility] website at:  https://[place].gov/dhs/people-we-serve/adults/health-care/mental-health/resources/crisis-contacts.jsp

## 2022-09-12 NOTE — PROGRESS NOTES
Aminah is a 47 year old who is being evaluated via a billable video visit.      How would you like to obtain your AVS? MyChart  If the video visit is dropped, the invitation should be resent by: Text to cell phone: [phone]  Will anyone else be joining your video visit? No          Assessment & Plan   Problem List Items Addressed This Visit    None     Visit Diagnoses     Major depressive disorder, recurrent episode, moderate (H)          In remission with her duloxetine 60 mg 1 p.o. twice daily.  Sent in a 3-month supply with 1 refill and will plan to follow-up in 6 months.  She should also follow-up for her routine preventative care visit.  She can follow-up sooner if symptoms worsen.                 Return in about 6 months (around 3/12/2023) for Routine preventive, Follow up.    Mari Abraham MD  [facility]   Aminah is a 47 year old, presenting for the following health issues:  No chief complaint on file.      History of Present Illness       Mental Health Follow-up:  Patient presents to follow-up on Depression.Patient's depression since last visit has been:  Good  The patient is not having other symptoms associated with depression.      Any significant life events: relationship concerns  Patient is not feeling anxious or having panic attacks.  Patient has no concerns about alcohol or drug use.    She eats 2-3 servings of fruits and vegetables daily.She consumes 2 sweetened beverage(s) daily.She exercises with enough effort to increase her heart rate 10 to 19 minutes per day.  She exercises with enough effort to increase her heart rate 5 days per week.   She is taking medications regularly.    Today's PHQ-9         PHQ-9 Total Score: 0    PHQ-9 Q9 Thoughts of better off dead/self-harm past 2 weeks :   Not at all    How difficult have these problems made it for you to do your work, take care of things at home, or get along with other people: Not difficult  at all     Depression is in remission on her duloxetine 60 mg 1 p.o. twice daily.        Review of Systems         Objective           Vitals:  No vitals were obtained today due to virtual visit.    Physical Exam   GENERAL: Healthy, alert and no distress  EYES: Eyes grossly normal to inspection.  No discharge or erythema, or obvious scleral/conjunctival abnormalities.  RESP: No audible wheeze, cough, or visible cyanosis.  No visible retractions or increased work of breathing.    SKIN: Visible skin clear. No significant rash, abnormal pigmentation or lesions.  NEURO: Cranial nerves grossly intact.  Mentation and speech appropriate for age.  PSYCH: Mentation appears normal, affect normal/bright, judgement and insight intact, normal speech and appearance well-groomed.                Video-Visit Details    Video Start Time: 1651    Type of service:  Video Visit    Video End Time:4:54 PM    Originating Location (pt. Location): Home    Distant Location (provider location):  [facility]     Platform used for Video Visit: [employer]

## 2022-10-03 ENCOUNTER — HEALTH MAINTENANCE LETTER (OUTPATIENT)
Age: 47
End: 2022-10-03

## 2023-05-20 ENCOUNTER — HEALTH MAINTENANCE LETTER (OUTPATIENT)
Age: 48
End: 2023-05-20

## 2023-08-01 DIAGNOSIS — F33.1 MAJOR DEPRESSIVE DISORDER, RECURRENT EPISODE, MODERATE (H): ICD-10-CM

## 2023-08-02 RX ORDER — DULOXETIN HYDROCHLORIDE 60 MG/1
CAPSULE, DELAYED RELEASE ORAL
Qty: 30 CAPSULE | Refills: 0 | Status: SHIPPED | OUTPATIENT
Start: 2023-08-02 | End: 2023-08-08

## 2023-08-02 NOTE — TELEPHONE ENCOUNTER
Routing refill request to provider for review/approval because:  LOV 9/12/2022    Care team please reach out to patient to schedule appointment       Serotonin-Norepinephrine Reuptake Inhibitors  Failed    Blood pressure under 140/90 in past 12 months    PHQ-9 score of less than 5 in past 6 months    Recent (6 mo) or future (30 days) visit within the authorizing provider's specialty           SIDNEY Hernández  [facility]

## 2023-08-03 DIAGNOSIS — F33.1 MAJOR DEPRESSIVE DISORDER, RECURRENT EPISODE, MODERATE (H): ICD-10-CM

## 2023-08-03 NOTE — TELEPHONE ENCOUNTER
"Routing refill request to provider for review/approval because:  Labs not current  Patient needs to be seen because:  last OV >6 months  No BP on file in past 12 months    Last Written Prescription Date:  8/2/23  Last Fill Quantity: 30,  # refills: 0   Last office visit provider:  9/12/22     Requested Prescriptions   Pending Prescriptions Disp Refills    DULoxetine (CYMBALTA) 60 MG capsule [Pharmacy Med Name: DULoxetine 60MG     CAP] 30 capsule 0     Sig: PLEASE SCHEDULE FOLLOW-UP APPOINTMENT BEFORE NEXT REFILL.       Serotonin-Norepinephrine Reuptake Inhibitors  Failed - 8/3/2023  8:19 AM        Failed - Blood pressure under 140/90 in past 12 months     BP Readings from Last 3 Encounters:   10/07/20 124/82   07/12/19 120/88   04/25/19 116/70                 Failed - PHQ-9 score of less than 5 in past 6 months     Please review last PHQ-9 score.           Failed - Recent (6 mo) or future (30 days) visit within the authorizing provider's specialty     Patient had office visit in the last 6 months or has a visit in the next 30 days with authorizing provider or within the authorizing provider's specialty.  See \"Patient Info\" tab in inbasket, or \"Choose Columns\" in Meds & Orders section of the refill encounter.            Passed - Medication is active on med list        Passed - Patient is age 18 or older        Passed - No active pregnancy on record        Passed - No positive pregnancy test in past 12 months             EDGAR AGUERO RN 08/03/23 12:45 PM  "

## 2023-08-04 RX ORDER — DULOXETIN HYDROCHLORIDE 60 MG/1
CAPSULE, DELAYED RELEASE ORAL
Qty: 30 CAPSULE | Refills: 0 | OUTPATIENT
Start: 2023-08-04

## 2023-08-05 ASSESSMENT — ANXIETY QUESTIONNAIRES
7. FEELING AFRAID AS IF SOMETHING AWFUL MIGHT HAPPEN: NOT AT ALL
1. FEELING NERVOUS, ANXIOUS, OR ON EDGE: NOT AT ALL
4. TROUBLE RELAXING: NOT AT ALL
3. WORRYING TOO MUCH ABOUT DIFFERENT THINGS: NOT AT ALL
2. NOT BEING ABLE TO STOP OR CONTROL WORRYING: NOT AT ALL
IF YOU CHECKED OFF ANY PROBLEMS ON THIS QUESTIONNAIRE, HOW DIFFICULT HAVE THESE PROBLEMS MADE IT FOR YOU TO DO YOUR WORK, TAKE CARE OF THINGS AT HOME, OR GET ALONG WITH OTHER PEOPLE: NOT DIFFICULT AT ALL
6. BECOMING EASILY ANNOYED OR IRRITABLE: NOT AT ALL
5. BEING SO RESTLESS THAT IT IS HARD TO SIT STILL: NOT AT ALL
GAD7 TOTAL SCORE: 0
GAD7 TOTAL SCORE: 0

## 2023-08-07 ASSESSMENT — PATIENT HEALTH QUESTIONNAIRE - PHQ9
SUM OF ALL RESPONSES TO PHQ QUESTIONS 1-9: 1
SUM OF ALL RESPONSES TO PHQ QUESTIONS 1-9: 1
10. IF YOU CHECKED OFF ANY PROBLEMS, HOW DIFFICULT HAVE THESE PROBLEMS MADE IT FOR YOU TO DO YOUR WORK, TAKE CARE OF THINGS AT HOME, OR GET ALONG WITH OTHER PEOPLE: SOMEWHAT DIFFICULT

## 2023-08-08 ENCOUNTER — VIRTUAL VISIT (OUTPATIENT)
Dept: FAMILY MEDICINE | Facility: CLINIC | Age: 48
End: 2023-08-08
Payer: COMMERCIAL

## 2023-08-08 DIAGNOSIS — Z12.11 SCREEN FOR COLON CANCER: ICD-10-CM

## 2023-08-08 DIAGNOSIS — Z80.0 FAMILY HISTORY OF COLON CANCER: Primary | ICD-10-CM

## 2023-08-08 DIAGNOSIS — Z98.890 HISTORY OF FOOT SURGERY: ICD-10-CM

## 2023-08-08 DIAGNOSIS — F32.5 DEPRESSION, MAJOR, IN REMISSION (H): ICD-10-CM

## 2023-08-08 PROCEDURE — 99213 OFFICE O/P EST LOW 20 MIN: CPT | Mod: VID | Performed by: FAMILY MEDICINE

## 2023-08-08 PROCEDURE — 96127 BRIEF EMOTIONAL/BEHAV ASSMT: CPT | Mod: VID | Performed by: FAMILY MEDICINE

## 2023-08-08 RX ORDER — DULOXETIN HYDROCHLORIDE 60 MG/1
CAPSULE, DELAYED RELEASE ORAL
Qty: 90 CAPSULE | Refills: 1 | Status: SHIPPED | OUTPATIENT
Start: 2023-08-08 | End: 2023-08-10

## 2023-08-08 NOTE — PROGRESS NOTES
Aminah is a 47 year old who is being evaluated via a billable video visit.      How would you like to obtain your AVS? MyChart  If the video visit is dropped, the invitation should be resent by:   Will anyone else be joining your video visit? No          Assessment & Plan   Problem List Items Addressed This Visit    None  Visit Diagnoses       Family history of colon cancer    -  Primary    Relevant Orders    Adult GI  Referral - Procedure Only    Screen for colon cancer        Relevant Orders    Adult GI  Referral - Procedure Only    Depression, major, in remission (H)        Relevant Medications    DULoxetine (CYMBALTA) 60 MG capsule    History of foot surgery            Major depression currently in remission on duloxetine 60 mg daily.  PHQ-9 today is equal to 1.  Follow-up in 6 months.    Patient is 47 has never had colon cancer screening.  Her maternal grandmother had colon cancer and her sister has had 3 adenomatous polyps.  She would like to get a colonoscopy done at the [facility].  This was ordered for her.    Patient is due for well adult exam.  Also looks like she is due for her Pap smear.  She will schedule a preventative visit.    Patient had foot surgery 5 months ago and will finally be able to go back to work tomorrow.  Its been a little rough not having the income and also being stuck at home.  However she now feels like she is ready to return to work and that the foot is healed well.               Mari Abraham MD  [facility]    Subjective   Aminah is a 47 year old, presenting for the following health issues:  Recheck Medication (For Depression-doing well on Duloxetine med. )        8/8/2023     4:22 PM   Additional Questions   Roomed by Elsie BUCHANAN   Accompanied by Self       History of Present Illness       Mental Health Follow-up:  Patient presents to follow-up on Depression.Patient's depression since last visit has been:  No  change  The patient is not having other symptoms associated with depression.      Any significant life events: health concerns  Patient is not feeling anxious or having panic attacks.  Patient has no concerns about alcohol or drug use.    She eats 2-3 servings of fruits and vegetables daily.She consumes 1 sweetened beverage(s) daily.She exercises with enough effort to increase her heart rate 30 to 60 minutes per day.  She exercises with enough effort to increase her heart rate 7 days per week.   She is taking medications regularly.               Review of Systems         Objective           Vitals:  No vitals were obtained today due to virtual visit.    Physical Exam   GENERAL: Healthy, alert and no distress  EYES: Eyes grossly normal to inspection.  No discharge or erythema, or obvious scleral/conjunctival abnormalities.  RESP: No audible wheeze, cough, or visible cyanosis.  No visible retractions or increased work of breathing.    SKIN: Visible skin clear. No significant rash, abnormal pigmentation or lesions.  NEURO: Cranial nerves grossly intact.  Mentation and speech appropriate for age.  PSYCH: Mentation appears normal, affect normal/bright, judgement and insight intact, normal speech and appearance well-groomed.                Video-Visit Details    Type of service:  Video Visit     Originating Location (pt. Location): Home    Distant Location (provider location):  On-site  Platform used for Video Visit: [employer]

## 2023-08-10 DIAGNOSIS — F32.5 DEPRESSION, MAJOR, IN REMISSION (H): ICD-10-CM

## 2023-08-15 RX ORDER — DULOXETIN HYDROCHLORIDE 60 MG/1
CAPSULE, DELAYED RELEASE ORAL
Qty: 90 CAPSULE | Refills: 1 | Status: SHIPPED | OUTPATIENT
Start: 2023-08-15 | End: 2023-12-01

## 2023-08-16 NOTE — TELEPHONE ENCOUNTER
Medication was sent yesterday 08/15 for 6 MO, patient has appointment scheduled 09/11 with PCP. Encounter closed.

## 2023-09-11 ENCOUNTER — OFFICE VISIT (OUTPATIENT)
Dept: FAMILY MEDICINE | Facility: CLINIC | Age: 48
End: 2023-09-11
Attending: FAMILY MEDICINE
Payer: COMMERCIAL

## 2023-09-11 VITALS
DIASTOLIC BLOOD PRESSURE: 70 MMHG | TEMPERATURE: 97.8 F | HEIGHT: 64 IN | SYSTOLIC BLOOD PRESSURE: 110 MMHG | RESPIRATION RATE: 16 BRPM | OXYGEN SATURATION: 98 % | BODY MASS INDEX: 36.88 KG/M2 | WEIGHT: 216 LBS | HEART RATE: 77 BPM

## 2023-09-11 DIAGNOSIS — E66.01 CLASS 2 SEVERE OBESITY DUE TO EXCESS CALORIES WITH SERIOUS COMORBIDITY AND BODY MASS INDEX (BMI) OF 37.0 TO 37.9 IN ADULT (H): ICD-10-CM

## 2023-09-11 DIAGNOSIS — Z12.11 SCREEN FOR COLON CANCER: Primary | ICD-10-CM

## 2023-09-11 DIAGNOSIS — Z01.818 PREOP GENERAL PHYSICAL EXAM: ICD-10-CM

## 2023-09-11 DIAGNOSIS — Z13.1 SCREENING FOR DIABETES MELLITUS: ICD-10-CM

## 2023-09-11 DIAGNOSIS — E78.9 DISORDER OF LIPOID METABOLISM: ICD-10-CM

## 2023-09-11 DIAGNOSIS — E66.812 CLASS 2 SEVERE OBESITY DUE TO EXCESS CALORIES WITH SERIOUS COMORBIDITY AND BODY MASS INDEX (BMI) OF 37.0 TO 37.9 IN ADULT (H): ICD-10-CM

## 2023-09-11 DIAGNOSIS — Z87.410 HISTORY OF CERVICAL DYSPLASIA: ICD-10-CM

## 2023-09-11 PROBLEM — F41.1 GENERALIZED ANXIETY DISORDER: Status: ACTIVE | Noted: 2023-09-11

## 2023-09-11 PROBLEM — F33.1 MODERATE EPISODE OF RECURRENT MAJOR DEPRESSIVE DISORDER (H): Status: ACTIVE | Noted: 2023-09-11

## 2023-09-11 PROBLEM — E78.5 HYPERLIPIDEMIA: Status: ACTIVE | Noted: 2023-09-11

## 2023-09-11 PROBLEM — M54.12 CERVICAL RADICULOPATHY: Status: ACTIVE | Noted: 2023-02-20

## 2023-09-11 PROCEDURE — 99213 OFFICE O/P EST LOW 20 MIN: CPT | Performed by: FAMILY MEDICINE

## 2023-09-11 NOTE — PROGRESS NOTES
[address] [address]  Phone: [phone]  Fax: [phone]  Primary Provider: Lachelle Ref-Primary, Physician  Pre-op Performing Provider: CARSON MOELLER      PREOPERATIVE EVALUATION:  Today's date: 9/11/2023    Aminah Chappell is a 48 year old female who presents for a preoperative evaluation.      9/11/2023     2:20 PM   Additional Questions   Roomed by Kathryn       Surgical Information:  Surgery/Procedure: colonoscopy  Surgery Location: [facility]  Surgeon: 9/25/2023  Surgery Date: Dr. Mora  Time of Surgery:   Where patient plans to recover: At home with family  Fax number for surgical facility: [phone]    Assessment & Plan     The proposed surgical procedure is considered LOW risk.    Problem List Items Addressed This Visit          Digestive    Class 2 severe obesity due to excess calories with serious comorbidity and body mass index (BMI) of 37.0 to 37.9 in adult (H)     Dyslipidemia              Other    History of cervical dysplasia     Will need pap smears until 2034 at least, hysterectomy for recurrent dysplasia          Other Visit Diagnoses       Screen for colon cancer    -  Primary    Preop general physical exam        Disorder of lipoid metabolism        Relevant Orders    Lipid panel reflex to direct LDL Fasting    Screening for diabetes mellitus        Relevant Orders    Glucose          Patient is here today for screening for colon cancer.  She needs a preop prior to her scheduled colonoscopy.    Dyslipidemia we will have patient get fasting labs done and return to clinic for annual exam    Screening for diabetes we will have her get a fasting glucose    History of cervical dysplasia will be a candidate for cervical cancer screening until 2034          - No identified additional risk factors other than previously addressed    Antiplatelet or Anticoagulation Medication Instructions:   - Patient is on no antiplatelet or  anticoagulation medications.    Additional Medication Instructions:  Patient is to take all scheduled medications on the day of surgery    RECOMMENDATION:  APPROVAL GIVEN to proceed with proposed procedure, without further diagnostic evaluation.            Subjective       HPI related to upcoming procedure: pt is due for colon cancer screening.  Positive family history of polyps in a sister.  mGM  from colon cancer.  Here for preop.        2023    12:48 AM   Preop Questions   1. Have you ever had a heart attack or stroke? No   2. Have you ever had surgery on your heart or blood vessels, such as a stent placement, a coronary artery bypass, or surgery on an artery in your head, neck, heart, or legs? No   3. Do you have chest pain with activity? No   4. Do you have a history of  heart failure? No   5. Do you currently have a cold, bronchitis or symptoms of other infection? No   6. Do you have a cough, shortness of breath, or wheezing? No   7. Do you or anyone in your family have previous history of blood clots? No   8. Do you or does anyone in your family have a serious bleeding problem such as prolonged bleeding following surgeries or cuts? No   9. Have you ever had problems with anemia or been told to take iron pills? No   10. Have you had any abnormal blood loss such as black, tarry or bloody stools, or abnormal vaginal bleeding? No   11. Have you ever had a blood transfusion? No   12. Are you willing to have a blood transfusion if it is medically needed before, during, or after your surgery? Yes   13. Have you or any of your relatives ever had problems with anesthesia? No   14. Do you have sleep apnea, excessive snoring or daytime drowsiness? No   15. Do you have any artifical heart valves or other implanted medical devices like a pacemaker, defibrillator, or continuous glucose monitor? No   16. Do you have artificial joints? No   17. Are you allergic to latex? No   18. Is there any chance that you may  "be pregnant? No       Health Care Directive:  Patient does not have a Health Care Directive or Living Will: Discussed advance care planning with patient; information given to patient to review.    Preoperative Review of :   reviewed - in 02/2023 pt given limited number of narcotics for post op pain, no longer taking them.          Review of Systems  Neg except as per hpi    Patient Active Problem List    Diagnosis Date Noted    Moderate episode of recurrent major depressive disorder (H) 09/11/2023     Priority: Medium    Hyperlipidemia 09/11/2023     Priority: Medium    History of cervical dysplasia 09/11/2023     Priority: Medium    Generalized anxiety disorder 09/11/2023     Priority: Medium    Class 2 severe obesity due to excess calories with serious comorbidity and body mass index (BMI) of 37.0 to 37.9 in adult (H) 09/11/2023     Priority: Medium    Cervical radiculopathy 02/20/2023     Priority: Medium     Formatting of this note might be different from the original. Left arm numbness and pain      Cervical high risk human papillomavirus (HPV) DNA test positive 09/23/2010     Priority: Medium      No past medical history on file.  Past Surgical History:   Procedure Laterality Date    BIOPSY  11/01/2016    DILATION AND CURETTAGE  1992     INJECT SELECT COR/GRAFT ANGIO DURING CONGENITAL HT CATH  1997    LAPAROSCOPIC ASSISTED HYSTERECTOMY VAGINAL  03/11/2009    MAMMOGRAM - HIM SCAN  10/07/2013     Current Outpatient Medications   Medication Sig Dispense Refill    DULoxetine (CYMBALTA) 60 MG capsule 1 po qday 90 capsule 1       No Known Allergies     Social History     Tobacco Use    Smoking status: Never    Smokeless tobacco: Never   Substance Use Topics    Alcohol use: Not on file       History   Drug Use Not on file         Objective     /70 (BP Location: Right arm, Patient Position: Sitting)   Pulse 77   Temp 97.8  F (36.6  C) (Tympanic)   Resp 16   Ht 1.626 m (5' 4\")   Wt 98 kg (216 lb)   " SpO2 98%   BMI 37.08 kg/m      Physical Exam    General: alert and oriented ×3 no acute distress.    HEENT: pupils are equal round and reactive to light extraocular motion is intact. Normocephalic and atraumatic.     Hearing is grossly normal and there is no otorrhea.     Chest: has bilateral rise with no increased work of breathing.  ctab    Cardiovascular: normal perfusion and brisk capillary refill. s1s2    Musculoskeletal: no gross focal abnormalities and normal gait.    Neuro: no gross focal abnormalities and memory seems intact.    Psychiatric: speech is clear and coherent and fluent. Patient dressed appropriately for the weather. Mood is appropriate and affect is full.          No results for input(s): HGB, PLT, INR, NA, POTASSIUM, CR, A1C in the last 02111 hours.     Diagnostics:  Routine screening labs ordered but not required for preop   No EKG required for low risk surgery (cataract, skin procedure, breast biopsy, etc).    Revised Cardiac Risk Index (RCRI):  The patient has the following serious cardiovascular risks for perioperative complications:   - No serious cardiac risks = 0 points     RCRI Interpretation: 0 points: Class I (very low risk - 0.4% complication rate)         Signed Electronically by: Mari Abraham MD  Copy of this evaluation report is provided to requesting physician.

## 2023-09-11 NOTE — PATIENT INSTRUCTIONS
For informational purposes only. Not to replace the advice of your health care provider. Copyright   2003,  [employer] [facility]. All rights reserved. Clinically reviewed by Carmen Sams MD. [employer] [id number] - REV .  Preparing for Your Surgery  Getting started  A nurse will call you to review your health history and instructions. They will give you an arrival time based on your scheduled surgery time. Please be ready to share:  Your doctor's clinic name and phone number  Your medical, surgical, and anesthesia history  A list of allergies and sensitivities  A list of medicines, including herbal treatments and over-the-counter drugs  Whether the patient has a legal guardian (ask how to send us the papers in advance)  Please tell us if you're pregnant--or if there's any chance you might be pregnant. Some surgeries may injure a fetus (unborn baby), so they require a pregnancy test. Surgeries that are safe for a fetus don't always need a test, and you can choose whether to have one.   If you have a child who's having surgery, please ask for a copy of Preparing for Your Child's Surgery.    Preparing for surgery  Within 10 to 30 days of surgery: Have a pre-op exam (sometimes called an H&P, or History and Physical). This can be done at a clinic or pre-operative center.  If you're having a , you may not need this exam. Talk to your care team.  At your pre-op exam, talk to your care team about all medicines you take. If you need to stop any medicines before surgery, ask when to start taking them again.  We do this for your safety. Many medicines can make you bleed too much during surgery. Some change how well surgery (anesthesia) drugs work.  Call your insurance company to let them know you're having surgery. (If you don't have insurance, call [phone].)  Call your clinic if there's any change in your health. This includes signs of a cold or flu (sore throat, runny nose, cough, rash, fever). It also  includes a scrape or scratch near the surgery site.  If you have questions on the day of surgery, call your hospital or surgery center.  Eating and drinking guidelines  For your safety: Unless your surgeon tells you otherwise, follow the guidelines below.  Eat and drink as usual until 8 hours before you arrive for surgery. After that, no food or milk.  Drink clear liquids until 2 hours before you arrive. These are liquids you can see through, like water, Gatorade, and Propel Water. They also include plain black coffee and tea (no cream or milk), candy, and breath mints. You can spit out gum when you arrive.  If you drink alcohol: Stop drinking it the night before surgery.  If your care team tells you to take medicine on the morning of surgery, it's okay to take it with a sip of water.  Preventing infection  Shower or bathe the night before and morning of your surgery. Follow the instructions your clinic gave you. (If no instructions, use regular soap.)  Don't shave or clip hair near your surgery site. We'll remove the hair if needed.  Don't smoke or vape the morning of surgery. You may chew nicotine gum up to 2 hours before surgery. A nicotine patch is okay.  Note: Some surgeries require you to completely quit smoking and nicotine. Check with your surgeon.  Your care team will make every effort to keep you safe from infection. We will:  Clean our hands often with soap and water (or an alcohol-based hand rub).  Clean the skin at your surgery site with a special soap that kills germs.  Give you a special gown to keep you warm. (Cold raises the risk of infection.)  Wear special hair covers, masks, gowns and gloves during surgery.  Give antibiotic medicine, if prescribed. Not all surgeries need antibiotics.  What to bring on the day of surgery  Photo ID and insurance card  Copy of your health care directive, if you have one  Glasses and hearing aids (bring cases)  You can't wear contacts during surgery  Inhaler and eye  drops, if you use them (tell us about these when you arrive)  CPAP machine or breathing device, if you use them  A few personal items, if spending the night  If you have . . .  A pacemaker, ICD (cardiac defibrillator) or other implant: Bring the ID card.  An implanted stimulator: Bring the remote control.  A legal guardian: Bring a copy of the certified (court-stamped) guardianship papers.  Please remove any jewelry, including body piercings. Leave jewelry and other valuables at home.  If you're going home the day of surgery  You must have a responsible adult drive you home. They should stay with you overnight as well.  If you don't have someone to stay with you, and you aren't safe to go home alone, we may keep you overnight. Insurance often won't pay for this.  After surgery  If it's hard to control your pain or you need more pain medicine, please call your surgeon's office.  Questions?   If you have any questions for your care team, list them here: _________________________________________________________________________________________________________________________________________________________________________ ____________________________________ ____________________________________ ____________________________________

## 2023-11-25 ASSESSMENT — ENCOUNTER SYMPTOMS
COUGH: 0
SORE THROAT: 0
DIZZINESS: 0
ABDOMINAL PAIN: 0
PARESTHESIAS: 0
WEAKNESS: 0
PALPITATIONS: 0
HEMATURIA: 0
CHILLS: 0
NERVOUS/ANXIOUS: 0
JOINT SWELLING: 0
SHORTNESS OF BREATH: 0
HEADACHES: 0
EYE PAIN: 0
CONSTIPATION: 0
FREQUENCY: 0
BREAST MASS: 0
FEVER: 0
DIARRHEA: 0
HEARTBURN: 0
ARTHRALGIAS: 0
DYSURIA: 0
HEMATOCHEZIA: 0
MYALGIAS: 0
NAUSEA: 0

## 2023-12-01 ENCOUNTER — OFFICE VISIT (OUTPATIENT)
Dept: FAMILY MEDICINE | Facility: CLINIC | Age: 48
End: 2023-12-01
Payer: COMMERCIAL

## 2023-12-01 VITALS
HEART RATE: 69 BPM | SYSTOLIC BLOOD PRESSURE: 100 MMHG | HEIGHT: 64 IN | RESPIRATION RATE: 16 BRPM | OXYGEN SATURATION: 100 % | WEIGHT: 210 LBS | TEMPERATURE: 97.2 F | BODY MASS INDEX: 35.85 KG/M2 | DIASTOLIC BLOOD PRESSURE: 70 MMHG

## 2023-12-01 DIAGNOSIS — E78.2 MIXED HYPERLIPIDEMIA: ICD-10-CM

## 2023-12-01 DIAGNOSIS — K57.30 DIVERTICULOSIS OF LARGE INTESTINE WITHOUT HEMORRHAGE: ICD-10-CM

## 2023-12-01 DIAGNOSIS — E66.812 CLASS 2 SEVERE OBESITY DUE TO EXCESS CALORIES WITH SERIOUS COMORBIDITY AND BODY MASS INDEX (BMI) OF 36.0 TO 36.9 IN ADULT (H): ICD-10-CM

## 2023-12-01 DIAGNOSIS — Z11.59 NEED FOR HEPATITIS C SCREENING TEST: ICD-10-CM

## 2023-12-01 DIAGNOSIS — E66.812 CLASS 2 SEVERE OBESITY DUE TO EXCESS CALORIES WITH SERIOUS COMORBIDITY AND BODY MASS INDEX (BMI) OF 37.0 TO 37.9 IN ADULT (H): ICD-10-CM

## 2023-12-01 DIAGNOSIS — Z13.1 SCREENING FOR DIABETES MELLITUS: ICD-10-CM

## 2023-12-01 DIAGNOSIS — Z12.4 CERVICAL CANCER SCREENING: ICD-10-CM

## 2023-12-01 DIAGNOSIS — R87.810 CERVICAL HIGH RISK HUMAN PAPILLOMAVIRUS (HPV) DNA TEST POSITIVE: ICD-10-CM

## 2023-12-01 DIAGNOSIS — Z12.11 SCREEN FOR COLON CANCER: ICD-10-CM

## 2023-12-01 DIAGNOSIS — Z00.00 ROUTINE GENERAL MEDICAL EXAMINATION AT A HEALTH CARE FACILITY: Primary | ICD-10-CM

## 2023-12-01 DIAGNOSIS — E66.01 CLASS 2 SEVERE OBESITY DUE TO EXCESS CALORIES WITH SERIOUS COMORBIDITY AND BODY MASS INDEX (BMI) OF 36.0 TO 36.9 IN ADULT (H): ICD-10-CM

## 2023-12-01 DIAGNOSIS — K64.8 INTERNAL HEMORRHOIDS WITHOUT COMPLICATION: ICD-10-CM

## 2023-12-01 DIAGNOSIS — Z11.4 SCREENING FOR HIV (HUMAN IMMUNODEFICIENCY VIRUS): ICD-10-CM

## 2023-12-01 DIAGNOSIS — F32.5 DEPRESSION, MAJOR, IN REMISSION (H): ICD-10-CM

## 2023-12-01 DIAGNOSIS — E66.01 CLASS 2 SEVERE OBESITY DUE TO EXCESS CALORIES WITH SERIOUS COMORBIDITY AND BODY MASS INDEX (BMI) OF 37.0 TO 37.9 IN ADULT (H): ICD-10-CM

## 2023-12-01 PROBLEM — F33.1 MODERATE EPISODE OF RECURRENT MAJOR DEPRESSIVE DISORDER (H): Status: RESOLVED | Noted: 2023-09-11 | Resolved: 2023-12-01

## 2023-12-01 LAB
ALBUMIN SERPL BCG-MCNC: 4.3 G/DL (ref 3.5–5.2)
ALP SERPL-CCNC: 55 U/L (ref 40–150)
ALT SERPL W P-5'-P-CCNC: 18 U/L (ref 0–50)
ANION GAP SERPL CALCULATED.3IONS-SCNC: 8 MMOL/L (ref 7–15)
AST SERPL W P-5'-P-CCNC: 22 U/L (ref 0–45)
BILIRUB SERPL-MCNC: 0.6 MG/DL
BUN SERPL-MCNC: 12.1 MG/DL (ref 6–20)
CALCIUM SERPL-MCNC: 9.4 MG/DL (ref 8.6–10)
CHLORIDE SERPL-SCNC: 104 MMOL/L (ref 98–107)
CHOLEST SERPL-MCNC: 238 MG/DL
CREAT SERPL-MCNC: 0.86 MG/DL (ref 0.51–0.95)
DEPRECATED HCO3 PLAS-SCNC: 27 MMOL/L (ref 22–29)
EGFRCR SERPLBLD CKD-EPI 2021: 82 ML/MIN/1.73M2
GLUCOSE SERPL-MCNC: 96 MG/DL (ref 70–99)
HBA1C MFR BLD: 5.3 % (ref 0–5.6)
HDLC SERPL-MCNC: 51 MG/DL
HIV 1+2 AB+HIV1 P24 AG SERPL QL IA: NONREACTIVE
LDLC SERPL CALC-MCNC: 162 MG/DL
NONHDLC SERPL-MCNC: 187 MG/DL
POTASSIUM SERPL-SCNC: 4.6 MMOL/L (ref 3.4–5.3)
PROT SERPL-MCNC: 7.4 G/DL (ref 6.4–8.3)
SODIUM SERPL-SCNC: 139 MMOL/L (ref 135–145)
TRIGL SERPL-MCNC: 126 MG/DL

## 2023-12-01 PROCEDURE — 87624 HPV HI-RISK TYP POOLED RSLT: CPT | Performed by: FAMILY MEDICINE

## 2023-12-01 PROCEDURE — 99213 OFFICE O/P EST LOW 20 MIN: CPT | Mod: 25 | Performed by: FAMILY MEDICINE

## 2023-12-01 PROCEDURE — 87389 HIV-1 AG W/HIV-1&-2 AB AG IA: CPT | Performed by: FAMILY MEDICINE

## 2023-12-01 PROCEDURE — 80061 LIPID PANEL: CPT | Performed by: FAMILY MEDICINE

## 2023-12-01 PROCEDURE — 36415 COLL VENOUS BLD VENIPUNCTURE: CPT | Performed by: FAMILY MEDICINE

## 2023-12-01 PROCEDURE — 90471 IMMUNIZATION ADMIN: CPT | Performed by: FAMILY MEDICINE

## 2023-12-01 PROCEDURE — G0145 SCR C/V CYTO,THINLAYER,RESCR: HCPCS | Performed by: FAMILY MEDICINE

## 2023-12-01 PROCEDURE — 83036 HEMOGLOBIN GLYCOSYLATED A1C: CPT | Performed by: FAMILY MEDICINE

## 2023-12-01 PROCEDURE — 99396 PREV VISIT EST AGE 40-64: CPT | Mod: 25 | Performed by: FAMILY MEDICINE

## 2023-12-01 PROCEDURE — 90715 TDAP VACCINE 7 YRS/> IM: CPT | Performed by: FAMILY MEDICINE

## 2023-12-01 PROCEDURE — 86803 HEPATITIS C AB TEST: CPT | Performed by: FAMILY MEDICINE

## 2023-12-01 PROCEDURE — 80053 COMPREHEN METABOLIC PANEL: CPT | Performed by: FAMILY MEDICINE

## 2023-12-01 RX ORDER — DULOXETIN HYDROCHLORIDE 60 MG/1
CAPSULE, DELAYED RELEASE ORAL
Qty: 30 CAPSULE | Refills: 11 | Status: SHIPPED | OUTPATIENT
Start: 2023-12-01 | End: 2024-06-26

## 2023-12-01 RX ORDER — DULOXETIN HYDROCHLORIDE 60 MG/1
CAPSULE, DELAYED RELEASE ORAL
Qty: 90 CAPSULE | Refills: 3 | Status: SHIPPED | OUTPATIENT
Start: 2023-12-01 | End: 2023-12-01

## 2023-12-01 ASSESSMENT — ENCOUNTER SYMPTOMS
DYSURIA: 0
WEAKNESS: 0
EYE PAIN: 0
BREAST MASS: 0
ARTHRALGIAS: 0
DIARRHEA: 0
HEADACHES: 0
COUGH: 0
HEMATOCHEZIA: 0
FREQUENCY: 0
JOINT SWELLING: 0
PARESTHESIAS: 0
NERVOUS/ANXIOUS: 0
SORE THROAT: 0
DIZZINESS: 0
CONSTIPATION: 0
FEVER: 0
CHILLS: 0
ABDOMINAL PAIN: 0
HEARTBURN: 0
PALPITATIONS: 0
SHORTNESS OF BREATH: 0
MYALGIAS: 0
NAUSEA: 0
HEMATURIA: 0

## 2023-12-01 ASSESSMENT — ANXIETY QUESTIONNAIRES
GAD7 TOTAL SCORE: 0
1. FEELING NERVOUS, ANXIOUS, OR ON EDGE: NOT AT ALL
5. BEING SO RESTLESS THAT IT IS HARD TO SIT STILL: NOT AT ALL
6. BECOMING EASILY ANNOYED OR IRRITABLE: NOT AT ALL
IF YOU CHECKED OFF ANY PROBLEMS ON THIS QUESTIONNAIRE, HOW DIFFICULT HAVE THESE PROBLEMS MADE IT FOR YOU TO DO YOUR WORK, TAKE CARE OF THINGS AT HOME, OR GET ALONG WITH OTHER PEOPLE: NOT DIFFICULT AT ALL
7. FEELING AFRAID AS IF SOMETHING AWFUL MIGHT HAPPEN: NOT AT ALL
3. WORRYING TOO MUCH ABOUT DIFFERENT THINGS: NOT AT ALL
2. NOT BEING ABLE TO STOP OR CONTROL WORRYING: NOT AT ALL
GAD7 TOTAL SCORE: 0

## 2023-12-01 ASSESSMENT — PATIENT HEALTH QUESTIONNAIRE - PHQ9
5. POOR APPETITE OR OVEREATING: NOT AT ALL
SUM OF ALL RESPONSES TO PHQ QUESTIONS 1-9: 0

## 2023-12-01 NOTE — PROGRESS NOTES
SUBJECTIVE:   Aminah is a 48 year old, presenting for the following:  Physical        2023     9:58 AM   Additional Questions   Roomed by Vicki ROCK       Healthy Habits:     Getting at least 3 servings of Calcium per day:  Yes    Bi-annual eye exam:  Yes    Dental care twice a year:  Yes    Sleep apnea or symptoms of sleep apnea:  None    Diet:  Regular (no restrictions)    Frequency of exercise:  2-3 days/week    Duration of exercise:  30-45 minutes    Taking medications regularly:  Yes    Barriers to taking medications:  None    Medication side effects:  Not applicable    Additional concerns today:  No                  Have you ever done Advance Care Planning? (For example, a Health Directive, POLST, or a discussion with a medical provider or your loved ones about your wishes): No, advance care planning information given to patient to review.  Advanced care planning was discussed at today's visit.    Social History     Tobacco Use    Smoking status: Never     Passive exposure: Never    Smokeless tobacco: Never   Substance Use Topics    Alcohol use: Not Currently             2023     7:31 PM   Alcohol Use   Prescreen: >3 drinks/day or >7 drinks/week? No     Reviewed orders with patient.  Reviewed health maintenance and updated orders accordingly - Yes      Breast Cancer Screenin/6/2023    12:49 AM 2023     7:32 PM   Breast CA Risk Assessment (FHS-7)   Do you have a family history of breast, colon, or ovarian cancer? Yes No / Unknown       click delete button to remove this line now    Pertinent mammograms are reviewed under the imaging tab.    History of abnormal Pap smear: Status post hysterectomy. Pap still indicated. Through  based on cervical dysplasia needing surgical treatment with hysterectomy in      Reviewed and updated as needed this visit by clinical staff   Tobacco  Allergies  Meds  Problems  Med Hx  Surg Hx  Fam Hx          Reviewed and updated as  "needed this visit by Provider   Tobacco  Allergies  Meds  Problems  Med Hx  Surg Hx  Fam Hx         Past Medical History:   Diagnosis Date    Depressive disorder 1997    Moderate episode of recurrent major depressive disorder (H)       Past Surgical History:   Procedure Laterality Date    BIOPSY  11/01/2016    DILATION AND CURETTAGE  1992    HC INJECT SELECT COR/GRAFT ANGIO DURING CONGENITAL HT CATH  1997    LAPAROSCOPIC ASSISTED HYSTERECTOMY VAGINAL  03/11/2009    MAMMOGRAM - HIM SCAN  10/07/2013       Review of Systems   Constitutional:  Negative for chills and fever.   HENT:  Negative for congestion, ear pain, hearing loss and sore throat.    Eyes:  Negative for pain and visual disturbance.   Respiratory:  Negative for cough and shortness of breath.    Cardiovascular:  Negative for chest pain, palpitations and peripheral edema.   Gastrointestinal:  Negative for abdominal pain, constipation, diarrhea, heartburn, hematochezia and nausea.   Breasts:  Negative for tenderness, breast mass and discharge.   Genitourinary:  Negative for dysuria, frequency, genital sores, hematuria, pelvic pain, urgency, vaginal bleeding and vaginal discharge.   Musculoskeletal:  Negative for arthralgias, joint swelling and myalgias.   Skin:  Negative for rash.   Neurological:  Negative for dizziness, weakness, headaches and paresthesias.   Psychiatric/Behavioral:  Negative for mood changes. The patient is not nervous/anxious.           OBJECTIVE:   /70   Pulse 69   Temp 97.2  F (36.2  C)   Resp 16   Ht 1.626 m (5' 4\")   Wt 95.3 kg (210 lb)   LMP  (LMP Unknown)   SpO2 100%   BMI 36.05 kg/m    Physical Exam      General: alert and oriented ×3 no acute distress.    HEENT: pupils are equal round and reactive to light extraocular motion is intact. Normocephalic and atraumatic.     Hearing is grossly normal and there is no otorrhea.     Chest: has bilateral rise with no increased work of breathing. ctab    Cardiovascular: " normal perfusion and brisk capillary refill. s1s2    Musculoskeletal: no gross focal abnormalities and normal gait.    Neuro: no gross focal abnormalities and memory seems intact.    Psychiatric: speech is clear and coherent and fluent. Patient dressed appropriately for the weather. Mood is appropriate and affect is full.    : Normal external female genitalia. Vagina has no lesions there is normal physiological secretions present there is no odor no foreign body noticed.   Vulva has no lesions. Mucosa appears normal. Cervix surgically absent. Specimens obtained as noted in orders. Clitoral vazquez appears normal and there are no labial adhesions.            Diagnostic Test Results:  Labs reviewed in Epic    ASSESSMENT/PLAN:       ICD-10-CM    1. Routine general medical examination at a health care facility  Z00.00       2. Screen for colon cancer  Z12.11       3. Screening for HIV (human immunodeficiency virus)  Z11.4 HIV Antigen Antibody Combo     HIV Antigen Antibody Combo      4. Need for hepatitis C screening test  Z11.59 Hepatitis C Screen Reflex to HCV RNA Quant and Genotype     Hepatitis C Screen Reflex to HCV RNA Quant and Genotype      5. Cervical cancer screening  Z12.4 Pap Screen with HPV - recommended age 30 - 65 years      6. Cervical high risk human papillomavirus (HPV) DNA test positive  R87.810       7. Diverticulosis of large intestine without hemorrhage  K57.30       8. Internal hemorrhoids without complication  K64.8       9. Depression, major, in remission (H24)  F32.5 DULoxetine (CYMBALTA) 60 MG capsule     DISCONTINUED: DULoxetine (CYMBALTA) 60 MG capsule    Renewed duloxetine 60 mg daily on 8/8/2023.  PHQ-9 8/8/2023 was equal to 1.      10. Mixed hyperlipidemia  E78.2 Lipid panel reflex to direct LDL Fasting     Lipid panel reflex to direct LDL Fasting      11. Screening for diabetes mellitus  Z13.1 Hemoglobin A1c     Comprehensive metabolic panel (BMP + Alb, Alk Phos, ALT, AST, Total. Bili,  "TP)     Hemoglobin A1c     Comprehensive metabolic panel (BMP + Alb, Alk Phos, ALT, AST, Total. Bili, TP)      12. Class 2 severe obesity due to excess calories with serious comorbidity and body mass index (BMI) of 36.0 to 36.9 in adult (H)  E66.01     Z68.36       13. Class 2 severe obesity due to excess calories with serious comorbidity and body mass index (BMI) of 37.0 to 37.9 in adult (H)  E66.01     Z68.37       FASTING labs     Morbid obesity with hyperlipidemia we will get fasting lipid panel today.  She had a negative CT coronary angiogram in 2018.  Next year we should revisit possibly repeating this test in the meantime encouraged ongoing therapeutic lifestyle changes.  She reports that she has had success with this in the past doing calorie counting.      History of abnormal Pap smear: Status post hysterectomy. Pap still indicated. Through 2033 based on cervical dysplasia needing surgical treatment with hysterectomy in 2008    Chart review shows that recent colonoscopy found diverticulosis is well as grade 1 internal hemorrhoids.  Both of these are asymptomatic.  We did discuss signs and symptoms of diverticulitis so that patient is aware to watch for these and seek treatment if they occur.    Mammogram patient reports she is up-to-date, chart review shows she had her last mammogram on June 6, 2023 with Marla that was normal.    Colon cancer screening patient just had this completed earlier this year.  Discussed screening for hepatitis C and HIV using joint medical decision making we will have these done the next time she gets labs done which will be later today.                COUNSELING:  Reviewed preventive health counseling, as reflected in patient instructions      BMI:   Estimated body mass index is 36.05 kg/m  as calculated from the following:    Height as of this encounter: 1.626 m (5' 4\").    Weight as of this encounter: 95.3 kg (210 lb).   Weight management plan: Discussed healthy diet and " exercise guidelines      She reports that she has never smoked. She has never been exposed to tobacco smoke. She has never used smokeless tobacco.        Mari Abraham MD  [facility]

## 2023-12-01 NOTE — ASSESSMENT & PLAN NOTE
Patient had cervical dysplasia treated with hysterectomy in 2008.  Since that time she has had no abnormal Pap smears.  She will be a candidate for ongoing cervical cancer surveillance until 2033.

## 2023-12-02 LAB — HCV AB SERPL QL IA: NONREACTIVE

## 2023-12-06 LAB
BKR LAB AP GYN ADEQUACY: NORMAL
BKR LAB AP GYN INTERPRETATION: NORMAL
BKR LAB AP HPV REFLEX: NORMAL
BKR LAB AP PREVIOUS ABNORMAL: NORMAL
PATH REPORT.COMMENTS IMP SPEC: NORMAL
PATH REPORT.COMMENTS IMP SPEC: NORMAL
PATH REPORT.RELEVANT HX SPEC: NORMAL

## 2023-12-07 LAB
HUMAN PAPILLOMA VIRUS 16 DNA: NEGATIVE
HUMAN PAPILLOMA VIRUS 18 DNA: NEGATIVE
HUMAN PAPILLOMA VIRUS FINAL DIAGNOSIS: NORMAL
HUMAN PAPILLOMA VIRUS OTHER HR: NEGATIVE

## 2024-06-26 ENCOUNTER — NURSE TRIAGE (OUTPATIENT)
Dept: NURSING | Facility: CLINIC | Age: 49
End: 2024-06-26
Payer: COMMERCIAL

## 2024-06-26 DIAGNOSIS — F32.5 DEPRESSION, MAJOR, IN REMISSION (H): ICD-10-CM

## 2024-06-27 RX ORDER — DULOXETIN HYDROCHLORIDE 60 MG/1
CAPSULE, DELAYED RELEASE ORAL
Qty: 30 CAPSULE | Refills: 5 | Status: SHIPPED | OUTPATIENT
Start: 2024-06-27

## 2024-06-27 NOTE — TELEPHONE ENCOUNTER
Brittany calling. She had called yesterday regarding her duloxetine being sent to [facility], and they called her today and told her that they didn't get the prescription.    Prescription had not been sent. Prescription pended and routed to Dr. Abraham, who was the original ordering prescriber for review.     Germaine Dobson RN  [facility]  June 26, 2024, 7:12 PM    Reason for Disposition   Caller requesting a CONTROLLED substance prescription refill (e.g., narcotics, ADHD medicines)    Additional Information   Negative: New-onset or worsening symptoms, see that guideline (e.g., diarrhea, runny nose, sore throat)   Negative: Medicine question not related to refill or renewal   Negative: Caller (e.g., patient or pharmacist) requesting information about a new medicine   Negative: Caller requesting information unrelated to medicine   Negative: [1] Prescription refill request for ESSENTIAL medicine (i.e., likelihood of harm to patient if not taken) AND [2] triager unable to refill per department policy   Negative: [1] Prescription not at pharmacy AND [2] was prescribed by PCP recently  (Exception: Triager has access to EMR and prescription is recorded there. Go to Home Care and confirm for pharmacy.)   Negative: [1] Pharmacy calling with prescription questions AND [2] triager unable to answer question   Negative: Prescription request for new medicine (not a refill)    Protocols used: Medication Refill and Renewal Call-A-

## 2025-01-19 ENCOUNTER — HEALTH MAINTENANCE LETTER (OUTPATIENT)
Age: 50
End: 2025-01-19

## 2025-08-10 ENCOUNTER — HEALTH MAINTENANCE LETTER (OUTPATIENT)
Age: 50
End: 2025-08-10